# Patient Record
Sex: MALE | Race: WHITE | NOT HISPANIC OR LATINO | Employment: FULL TIME | URBAN - METROPOLITAN AREA
[De-identification: names, ages, dates, MRNs, and addresses within clinical notes are randomized per-mention and may not be internally consistent; named-entity substitution may affect disease eponyms.]

---

## 2017-04-24 ENCOUNTER — OFFICE VISIT (OUTPATIENT)
Dept: MEDICAL GROUP | Facility: CLINIC | Age: 57
End: 2017-04-24
Payer: COMMERCIAL

## 2017-04-24 VITALS
SYSTOLIC BLOOD PRESSURE: 110 MMHG | BODY MASS INDEX: 22.89 KG/M2 | DIASTOLIC BLOOD PRESSURE: 70 MMHG | OXYGEN SATURATION: 98 % | TEMPERATURE: 97.5 F | HEIGHT: 70 IN | HEART RATE: 78 BPM | WEIGHT: 159.9 LBS | RESPIRATION RATE: 18 BRPM

## 2017-04-24 DIAGNOSIS — Z13.29 SCREENING FOR THYROID DISORDER: ICD-10-CM

## 2017-04-24 DIAGNOSIS — Z83.3 FAMILY HISTORY OF DIABETES MELLITUS (DM): ICD-10-CM

## 2017-04-24 DIAGNOSIS — Z13.220 LIPID SCREENING: ICD-10-CM

## 2017-04-24 DIAGNOSIS — Z86.39 H/O HYPOGLYCEMIA: ICD-10-CM

## 2017-04-24 DIAGNOSIS — R06.83 SNORING: ICD-10-CM

## 2017-04-24 DIAGNOSIS — Z12.5 PROSTATE CANCER SCREENING: ICD-10-CM

## 2017-04-24 DIAGNOSIS — Z11.59 NEED FOR HEPATITIS C SCREENING TEST: ICD-10-CM

## 2017-04-24 DIAGNOSIS — F17.200 SMOKER: ICD-10-CM

## 2017-04-24 PROBLEM — K08.9 POOR DENTITION: Status: ACTIVE | Noted: 2017-04-24

## 2017-04-24 PROCEDURE — 99203 OFFICE O/P NEW LOW 30 MIN: CPT | Performed by: NURSE PRACTITIONER

## 2017-04-24 ASSESSMENT — PATIENT HEALTH QUESTIONNAIRE - PHQ9: CLINICAL INTERPRETATION OF PHQ2 SCORE: 0

## 2017-04-24 NOTE — MR AVS SNAPSHOT
"        Dionte Rodriges   2017 8:20 AM   Office Visit   MRN: [id number]    Department:  [facility]   Dept Phone:  [phone]    Description:  Male : 1960   Provider:  ROME Ruvalcaba           Reason for Visit     Establish Care           Allergies as of 2017     No Known Allergies      You were diagnosed with     H/O hypoglycemia   [896687]       Snoring   [987196]       Smoker   [776313]       Prostate cancer screening   [192068]       Lipid screening   [989947]       Screening for thyroid disorder   [V77.0.ICD-9-CM]       Family history of diabetes mellitus (DM)   [709345]       Need for hepatitis C screening test   [460384]         Vital Signs     Blood Pressure Pulse Temperature Respirations Height Weight    110/70 mmHg 78 36.4 °C (97.5 °F) 18 1.778 m (5' 10\") 72.53 kg (159 lb 14.4 oz)    Body Mass Index Oxygen Saturation Smoking Status             22.94 kg/m2 98% Current Every Day Smoker         Basic Information     Date Of Birth Sex Race Ethnicity Preferred Language    1960 Male White Non- English      Problem List              ICD-10-CM Priority Class Noted - Resolved    Smoker F17.200   2017 - Present    Family history of diabetes mellitus (DM) Z83.3   2017 - Present    H/O hypoglycemia Z86.39   2017 - Present    Poor dentition K08.9   2017 - Present      Health Maintenance        Date Due Completion Dates    IMM DTaP/Tdap/Td Vaccine (1 - Tdap) 1979 ---    COLONOSCOPY 2010 ---            Current Immunizations     No immunizations on file.      Below and/or attached are the medications your provider expects you to take. Review all of your home medications and newly ordered medications with your provider and/or pharmacist. Follow medication instructions as directed by your provider and/or pharmacist. Please keep your medication list with you and share with your provider. Update the information when medications are discontinued, " doses are changed, or new medications (including over-the-counter products) are added; and carry medication information at all times in the event of emergency situations     Allergies:  No Known Allergies          Medications  Valid as of: April 24, 2017 -  8:57 AM    Generic Name Brand Name Tablet Size Instructions for use    Multiple Vitamins-Minerals   Take  by mouth.        .                 Medicines prescribed today were sent to:     None      Medication refill instructions:       If your prescription bottle indicates you have medication refills left, it is not necessary to call your provider’s office. Please contact your pharmacy and they will refill your medication.    If your prescription bottle indicates you do not have any refills left, you may request refills at any time through one of the following ways: The online [employer] system (except Urgent Care), by calling your provider’s office, or by asking your pharmacy to contact your provider’s office with a refill request. Medication refills are processed only during regular business hours and may not be available until the next business day. Your provider may request additional information or to have a follow-up visit with you prior to refilling your medication.   *Please Note: Medication refills are assigned a new Rx number when refilled electronically. Your pharmacy may indicate that no refills were authorized even though a new prescription for the same medication is available at the pharmacy. Please request the medicine by name with the pharmacy before contacting your provider for a refill.        Your To Do List     Future Labs/Procedures Complete By Expires    CBC WITH DIFFERENTIAL  As directed 4/25/2018    COMP METABOLIC PANEL  As directed 4/25/2018    HEMOGLOBIN A1C  As directed 4/25/2018    HEP C VIRUS ANTIBODY  As directed 4/25/2018    LIPID PROFILE  As directed 4/25/2018    PROSTATE SPECIFIC AG SCREENING  As directed 4/25/2018    TSH  As directed  4/25/2018         [facility] Status: Patient Declined        Quit Tobacco Information     Do you want to quit using tobacco?    Quitting tobacco decreases risks of cancer, heart and lung disease, increases life expectancy, improves sense of taste and smell, and increases spending money, among other benefits.    If you are thinking about quitting, we can help.  • [facility] Quit Tobacco Program: [phone]  o Program occurs weekly for four weeks and includes pharmacist consultation on products to support quitting smoking or chewing tobacco. A provider referral is needed for pharmacist consultation.  • Tobacco Users Help Hotline: 5-897-QUIT-NOW (092-5290) or https://[place].quitlogix.org/  o Free, confidential telephone and online coaching for [place] residents. Sessions are designed on a schedule that is convenient for you. Eligible clients receive free nicotine replacement therapy.  • Nationally: www.smokefree.gov  o Information and professional assistance to support both immediate and long-term needs as you become, and remain, a non-smoker. Smokefree.gov allows you to choose the help that best fits your needs.

## 2017-04-24 NOTE — PROGRESS NOTES
CC: Establish Care        HPI:     Dionte presents today for the following:  Patient here to establish care. We discussed colonoscopy and colon cancer screening tests in detail, patient is not interested in doing this at this point in time  Transfer from [facility] clinic.   All problems are new to me today  Patient's past medical, family, and social history reviewed and placed in Epic today. Immunizations reviewed. Prescriptions-reviewed and none daily.     1. H/O hypoglycemia  Patient states he occasionally gets hypoglycemic episodes. These are mild. It never involved syncope. He states he doesn't need meals and tell evening time. Did not eat breakfast at night lunch and eats dinner. He sensitivity soda by side at all times to prevent hypoglycemia. Limited diet related poor dentition    2. Snoring  Does snore. Was previously being evaluated for sleep apnea however moved so much at night at the monitor came off his finger and had an incomplete study. Sometimes has difficulty sleeping and wakes up at night however if he sleeps with a fan and doesn't have this problem    3. Smoker  Smoker for 40 years, approximately three-quarter pack a day.    4. Need for hepatitis C screening test  Requesting this today. Has never had a blood transfusion, no IV drug use, monogamous relationship for over 30 years      Current Outpatient Prescriptions   Medication Sig Dispense Refill   • Multiple Vitamins-Minerals (CENTRUM SILVER PO) Take  by mouth.       No current facility-administered medications for this visit.     Social History   Substance Use Topics   • Smoking status: Current Every Day Smoker -- 0.75 packs/day for 40 years   • Smokeless tobacco: Never Used      Comment: 1/2ppd   • Alcohol Use: No      Comment: quite 12/3/1987     I reviewed patients allergies, problem list and medications today in [facility].    ROS: Any/all pertinent positives listed in the HPI, otherwise all others reviewed are negative today.      /70 mmHg   "Pulse 78  Temp(Src) 36.4 °C (97.5 °F)  Resp 18  Ht 1.778 m (5' 10\")  Wt 72.53 kg (159 lb 14.4 oz)  BMI 22.94 kg/m2  SpO2 98%    Exam:    Gen: Alert and oriented, No apparent distress. WDWN  Psych: A+Ox3, normal affect and mood  Skin: Warm, dry and intact. Good turgor   No rashes in visible areas.  Eye: Conjunctiva clear, lids normal  ENMT: Lips without lesions, very poor dentition   Oropharynx clear.   Neck: No Lymphadenopathy, Thyromegaly, Bruits.   Trachea midline, no masses  Lungs: Clear to auscultation bilaterally, no rales or rhonchi   Unlabored respiratory effort.   CV: Regular rate and rhythm, S1, S2. No murmurs.   No Edema  Abd: Soft non tender, non distended. Normal active bowel sounds.    No Hepatosplenomegaly, No pulsatile masses.   Ext: No clubbing, cyanosis, edema.       Assessment and Plan.   56 y.o. male with the following issues.    1. H/O hypoglycemia  Stable. Will monitor blood tests. We discussed more frequent meals with higher protein  - HEMOGLOBIN A1C; Future    2. Snoring  OPO on  room air    3. Smoker  Stable. Is not interested in quitting smoking at this time.  - CBC WITH DIFFERENTIAL; Future  - REFERRAL TO LUNG CANCER SCREENING PROGRAM    4. Prostate cancer screening  Ordered  - PROSTATE SPECIFIC AG SCREENING; Future    5. Lipid screening  Ordered  - LIPID PROFILE; Future    6. Screening for thyroid disorder  Ordered  - TSH; Future    7. Family history of diabetes mellitus (DM)  Ordered  - COMP METABOLIC PANEL; Future    8. Need for hepatitis C screening test  Ordered  - HEP C VIRUS ANTIBODY; Future        "

## 2017-05-09 ENCOUNTER — TELEPHONE (OUTPATIENT)
Dept: MEDICAL GROUP | Facility: CLINIC | Age: 57
End: 2017-05-09

## 2017-05-09 DIAGNOSIS — G47.30 SLEEP DISORDER BREATHING: ICD-10-CM

## 2017-05-09 NOTE — TELEPHONE ENCOUNTER
Please feel patient has test was positive for sleep apnea.  I placed a referral for further evaluation with pulmonology for test in the sleep lab.    If you would like in the meantime I can order oxygen for him to sleep with-please let me know if you would like this and I will order it at that time

## 2017-05-10 ENCOUNTER — DOCUMENTATION (OUTPATIENT)
Dept: HEMATOLOGY ONCOLOGY | Facility: MEDICAL CENTER | Age: 57
End: 2017-05-10

## 2017-05-10 NOTE — PROGRESS NOTES
Received referral to lung cancer screening program.  Chart review to assess for lung cancer screening program eligibility.   1. Age 55-77 yrs of age? Yes 57 y.o.  2. 30 pack year hx of smoking, or greater? Yes .75 [id number]= 30 pkyr hx  3. Current smoker or if quit, has pt quit within last 15 yrs?Yes, current smoker  4. Any signs or symptoms of lung cancer? None noted  5. Previous history of lung cancer? None noted  6. Chest CT within past 12 mos.? None  Patient DOES meet eligibility criteria - [facility] scheduling notified to schedule the shared decision making visit.

## 2017-05-15 ENCOUNTER — HOSPITAL ENCOUNTER (OUTPATIENT)
Dept: LAB | Facility: MEDICAL CENTER | Age: 57
End: 2017-05-15
Attending: NURSE PRACTITIONER
Payer: COMMERCIAL

## 2017-05-15 DIAGNOSIS — Z11.59 NEED FOR HEPATITIS C SCREENING TEST: ICD-10-CM

## 2017-05-15 DIAGNOSIS — Z12.5 PROSTATE CANCER SCREENING: ICD-10-CM

## 2017-05-15 DIAGNOSIS — Z13.220 LIPID SCREENING: ICD-10-CM

## 2017-05-15 DIAGNOSIS — Z13.29 SCREENING FOR THYROID DISORDER: ICD-10-CM

## 2017-05-15 DIAGNOSIS — F17.200 SMOKER: ICD-10-CM

## 2017-05-15 DIAGNOSIS — Z86.39 H/O HYPOGLYCEMIA: ICD-10-CM

## 2017-05-15 DIAGNOSIS — Z83.3 FAMILY HISTORY OF DIABETES MELLITUS (DM): ICD-10-CM

## 2017-05-15 LAB
ALBUMIN SERPL BCP-MCNC: 4.3 G/DL (ref 3.2–4.9)
ALBUMIN/GLOB SERPL: 1.5 G/DL
ALP SERPL-CCNC: 106 U/L (ref 30–99)
ALT SERPL-CCNC: 16 U/L (ref 2–50)
ANION GAP SERPL CALC-SCNC: 5 MMOL/L (ref 0–11.9)
AST SERPL-CCNC: 17 U/L (ref 12–45)
BASOPHILS # BLD AUTO: 0.3 % (ref 0–1.8)
BASOPHILS # BLD: 0.03 K/UL (ref 0–0.12)
BILIRUB SERPL-MCNC: 0.8 MG/DL (ref 0.1–1.5)
BUN SERPL-MCNC: 12 MG/DL (ref 8–22)
CALCIUM SERPL-MCNC: 9.6 MG/DL (ref 8.5–10.5)
CHLORIDE SERPL-SCNC: 104 MMOL/L (ref 96–112)
CHOLEST SERPL-MCNC: 185 MG/DL (ref 100–199)
CO2 SERPL-SCNC: 30 MMOL/L (ref 20–33)
CREAT SERPL-MCNC: 1.13 MG/DL (ref 0.5–1.4)
EOSINOPHIL # BLD AUTO: 0.31 K/UL (ref 0–0.51)
EOSINOPHIL NFR BLD: 2.7 % (ref 0–6.9)
ERYTHROCYTE [DISTWIDTH] IN BLOOD BY AUTOMATED COUNT: 39.3 FL (ref 35.9–50)
EST. AVERAGE GLUCOSE BLD GHB EST-MCNC: 108 MG/DL
GFR SERPL CREATININE-BSD FRML MDRD: >60 ML/MIN/1.73 M 2
GLOBULIN SER CALC-MCNC: 2.9 G/DL (ref 1.9–3.5)
GLUCOSE SERPL-MCNC: 92 MG/DL (ref 65–99)
HBA1C MFR BLD: 5.4 % (ref 0–5.6)
HCT VFR BLD AUTO: 54 % (ref 42–52)
HCV AB SER QL: NEGATIVE
HDLC SERPL-MCNC: 38 MG/DL
HGB BLD-MCNC: 18.6 G/DL (ref 14–18)
IMM GRANULOCYTES # BLD AUTO: 0.03 K/UL (ref 0–0.11)
IMM GRANULOCYTES NFR BLD AUTO: 0.3 % (ref 0–0.9)
LDLC SERPL CALC-MCNC: 112 MG/DL
LYMPHOCYTES # BLD AUTO: 2.38 K/UL (ref 1–4.8)
LYMPHOCYTES NFR BLD: 20.4 % (ref 22–41)
MCH RBC QN AUTO: 30.9 PG (ref 27–33)
MCHC RBC AUTO-ENTMCNC: 34.4 G/DL (ref 33.7–35.3)
MCV RBC AUTO: 89.7 FL (ref 81.4–97.8)
MONOCYTES # BLD AUTO: 0.66 K/UL (ref 0–0.85)
MONOCYTES NFR BLD AUTO: 5.7 % (ref 0–13.4)
NEUTROPHILS # BLD AUTO: 8.24 K/UL (ref 1.82–7.42)
NEUTROPHILS NFR BLD: 70.6 % (ref 44–72)
NRBC # BLD AUTO: 0 K/UL
NRBC BLD AUTO-RTO: 0 /100 WBC
PLATELET # BLD AUTO: 216 K/UL (ref 164–446)
PMV BLD AUTO: 10.5 FL (ref 9–12.9)
POTASSIUM SERPL-SCNC: 4.6 MMOL/L (ref 3.6–5.5)
PROT SERPL-MCNC: 7.2 G/DL (ref 6–8.2)
PSA SERPL-MCNC: 2.65 NG/ML (ref 0–4)
RBC # BLD AUTO: 6.02 M/UL (ref 4.7–6.1)
SODIUM SERPL-SCNC: 139 MMOL/L (ref 135–145)
TRIGL SERPL-MCNC: 175 MG/DL (ref 0–149)
TSH SERPL DL<=0.005 MIU/L-ACNC: 2.58 UIU/ML (ref 0.3–3.7)
WBC # BLD AUTO: 11.7 K/UL (ref 4.8–10.8)

## 2017-05-15 PROCEDURE — 84153 ASSAY OF PSA TOTAL: CPT

## 2017-05-15 PROCEDURE — 85025 COMPLETE CBC W/AUTO DIFF WBC: CPT

## 2017-05-15 PROCEDURE — 84443 ASSAY THYROID STIM HORMONE: CPT

## 2017-05-15 PROCEDURE — 86803 HEPATITIS C AB TEST: CPT

## 2017-05-15 PROCEDURE — 80053 COMPREHEN METABOLIC PANEL: CPT

## 2017-05-15 PROCEDURE — 80061 LIPID PANEL: CPT

## 2017-05-15 PROCEDURE — 36415 COLL VENOUS BLD VENIPUNCTURE: CPT

## 2017-05-15 PROCEDURE — 83036 HEMOGLOBIN GLYCOSYLATED A1C: CPT

## 2017-06-12 ENCOUNTER — OFFICE VISIT (OUTPATIENT)
Dept: HEMATOLOGY ONCOLOGY | Facility: MEDICAL CENTER | Age: 57
End: 2017-06-12
Payer: COMMERCIAL

## 2017-06-12 VITALS
TEMPERATURE: 98.8 F | HEIGHT: 70 IN | RESPIRATION RATE: 16 BRPM | DIASTOLIC BLOOD PRESSURE: 64 MMHG | BODY MASS INDEX: 23.61 KG/M2 | OXYGEN SATURATION: 97 % | SYSTOLIC BLOOD PRESSURE: 106 MMHG | HEART RATE: 73 BPM | WEIGHT: 164.9 LBS

## 2017-06-12 DIAGNOSIS — F17.210 CIGARETTE SMOKER: ICD-10-CM

## 2017-06-12 PROCEDURE — G0296 VISIT TO DETERM LDCT ELIG: HCPCS | Performed by: NURSE PRACTITIONER

## 2017-06-12 ASSESSMENT — ENCOUNTER SYMPTOMS
SHORTNESS OF BREATH: 1
WHEEZING: 0
HEMOPTYSIS: 0
SPUTUM PRODUCTION: 0
WEIGHT LOSS: 0
COUGH: 0

## 2017-06-12 ASSESSMENT — PAIN SCALES - GENERAL: PAINLEVEL: NO PAIN

## 2017-06-12 NOTE — MR AVS SNAPSHOT
"        Dionte Rodriges   2017 9:30 AM   Office Visit   MRN: [id number]    Department:  [facility]   Dept Phone:  [phone]    Description:  Male : 1960   Provider:  Sarah Kidd AWilliamsPPAMELA           Reason for Visit     Lung Cancer Screening Program Prescreen Ref: By Sujey Hartman Dx: Smoker      Allergies as of 2017     No Known Allergies      You were diagnosed with     Cigarette smoker   [780254]         Vital Signs     Blood Pressure Pulse Temperature Respirations Height Weight    106/64 mmHg 73 37.1 °C (98.8 °F) 16 1.778 m (5' 10\") 74.8 kg (164 lb 14.5 oz)    Body Mass Index Oxygen Saturation Smoking Status             23.66 kg/m2 97% Current Every Day Smoker         Basic Information     Date Of Birth Sex Race Ethnicity Preferred Language    1960 Male White Non- English      Your appointments     2017 11:20 AM   New Patient with C Rotation   [facility] (--)    [address]  [place] [id number] [phone]           Please bring enclosed paperwork completed along with your insurance card and photo ID.              Problem List              ICD-10-CM Priority Class Noted - Resolved    Smoker F17.200   2017 - Present    Family history of diabetes mellitus (DM) Z83.3   2017 - Present    H/O hypoglycemia Z86.39   2017 - Present    Poor dentition K08.9   2017 - Present      Health Maintenance        Date Due Completion Dates    IMM DTaP/Tdap/Td Vaccine (1 - Tdap) 1979 ---    IMM PNEUMOCOCCAL 19-64 (ADULT) MEDIUM RISK SERIES (1 of 1 - PPSV23) 1979 ---    COLONOSCOPY 2010 ---            Current Immunizations     No immunizations on file.      Below and/or attached are the medications your provider expects you to take. Review all of your home medications and newly ordered medications with your provider and/or pharmacist. Follow medication instructions as directed by your provider and/or " pharmacist. Please keep your medication list with you and share with your provider. Update the information when medications are discontinued, doses are changed, or new medications (including over-the-counter products) are added; and carry medication information at all times in the event of emergency situations     Allergies:  No Known Allergies          Medications  Valid as of: June 12, 2017 - 10:01 AM    Generic Name Brand Name Tablet Size Instructions for use    Multiple Vitamins-Minerals   Take  by mouth.        .                 Medicines prescribed today were sent to:     [id number] - [place], [place] - [address]    [address] [place] [address]    Phone: [phone] Fax: [phone]    Open 24 Hours?: No      Medication refill instructions:       If your prescription bottle indicates you have medication refills left, it is not necessary to call your provider’s office. Please contact your pharmacy and they will refill your medication.    If your prescription bottle indicates you do not have any refills left, you may request refills at any time through one of the following ways: The online [employer] system (except Urgent Care), by calling your provider’s office, or by asking your pharmacy to contact your provider’s office with a refill request. Medication refills are processed only during regular business hours and may not be available until the next business day. Your provider may request additional information or to have a follow-up visit with you prior to refilling your medication.   *Please Note: Medication refills are assigned a new Rx number when refilled electronically. Your pharmacy may indicate that no refills were authorized even though a new prescription for the same medication is available at the pharmacy. Please request the medicine by name with the pharmacy before contacting your provider for a refill.        Your To Do List     Future Labs/Procedures Complete By Expires    CT-LUNG  CANCER-SCREENING  As directed 6/12/2018         [employer] Access Code: Activation code not generated  Current [employer] Status: Active          Quit Tobacco Information     Do you want to quit using tobacco?    Quitting tobacco decreases risks of cancer, heart and lung disease, increases life expectancy, improves sense of taste and smell, and increases spending money, among other benefits.    If you are thinking about quitting, we can help.  • [facility] Quit Tobacco Program: 425.578.3419  o Program occurs weekly for four weeks and includes pharmacist consultation on products to support quitting smoking or chewing tobacco. A provider referral is needed for pharmacist consultation.  • Tobacco Users Help Hotline: 0-522-QUIT-NOW (740-8103) or https://[place].quitlogix.org/  o Free, confidential telephone and online coaching for [place] residents. Sessions are designed on a schedule that is convenient for you. Eligible clients receive free nicotine replacement therapy.  • Nationally: www.smokefree.gov  o Information and professional assistance to support both immediate and long-term needs as you become, and remain, a non-smoker. Smokefree.gov allows you to choose the help that best fits your needs.

## 2017-06-12 NOTE — PROGRESS NOTES
"Subjective:      Dionte Rodriges is a 56 y.o. male who presents with Lung Cancer Screening Program Prescreen for lung cancer screening shared decision making visit.           HPI    Patient seen today for initial lung cancer screening visit. Patient referred by his PCP ISATU Orozco.     The patient meets eligibility criteria including age, smoking history (30+ pack years), if former smoker, quit in the last 15 years, and absence of signs or symptoms of lung cancer.    - Age - 56  - Smoking history - Patient has smoked for 42 years at an average of 0.75 ppd = 31.5 pack year smoking history.  - Current smoking status - Current smoker and he is not interested in quitting. Patient stated he is cutting back a little bit but he will not quit altogether.   - No symptoms of lung cancer and no previous history of lung cancer     Patient worked around asbestos for about 9 years.     No Known Allergies  Current Outpatient Prescriptions on File Prior to Visit   Medication Sig Dispense Refill   • Multiple Vitamins-Minerals (CENTRUM SILVER PO) Take  by mouth.       No current facility-administered medications on file prior to visit.       Review of Systems   Constitutional: Positive for malaise/fatigue (d/t job as a ). Negative for weight loss.   Respiratory: Positive for shortness of breath. Negative for cough, hemoptysis, sputum production and wheezing.           Objective:     /64 mmHg  Pulse 73  Temp(Src) 37.1 °C (98.8 °F)  Resp 16  Ht 1.778 m (5' 10\")  Wt 74.8 kg (164 lb 14.5 oz)  BMI 23.66 kg/m2  SpO2 97%     Physical Exam   Constitutional: He is oriented to person, place, and time. He appears well-developed and well-nourished. No distress.   HENT:   Head: Normocephalic and atraumatic.   Cardiovascular: Normal rate, regular rhythm and normal heart sounds.  Exam reveals no gallop and no friction rub.    No murmur heard.  Pulmonary/Chest: Effort normal and breath sounds normal. No respiratory " distress. He has no wheezes.   Musculoskeletal: Normal range of motion.   Neurological: He is alert and oriented to person, place, and time.   Skin: Skin is warm and dry. He is not diaphoretic.   Vitals reviewed.              Assessment/Plan:     1. Cigarette smoker  CT-LUNG CANCER-SCREENING         We conducted a shared decision-making process using a decision aid. We reviewed benefits and harms of screening, including false positives and potential need for additional diagnostic testing, the possibility of over diagnosis, and total radiation exposure.    We discussed the importance of adhering to annual LDCT screening. We also discussed the impact of comorbities on the patient's the ability or willingness to undergo diagnostic procedure(s) and treatment.    Counseling on the importance of maintaining cigarette smoking abstinence if former smoker; or the importance of smoking cessation if current smoker and, if appropriate, furnishing of information about tobacco cessation interventions. I provided patient with smoking cessation materials and resources within [employer] and the community. He was not interested in the resources for himself, but stated his wife is interested in quitting so he did take the resources for her. Patient appreciative of the resources.       Based on our discussion, we have decided to begin annual lung cancer screening starting now.

## 2017-07-10 ENCOUNTER — HOSPITAL ENCOUNTER (OUTPATIENT)
Dept: RADIOLOGY | Facility: MEDICAL CENTER | Age: 57
End: 2017-07-10
Attending: NURSE PRACTITIONER
Payer: COMMERCIAL

## 2017-07-10 DIAGNOSIS — F17.210 CIGARETTE SMOKER: ICD-10-CM

## 2017-07-10 PROCEDURE — G0297 LDCT FOR LUNG CA SCREEN: HCPCS

## 2017-07-12 ENCOUNTER — TELEPHONE (OUTPATIENT)
Dept: HEMATOLOGY ONCOLOGY | Facility: MEDICAL CENTER | Age: 57
End: 2017-07-12

## 2017-07-12 DIAGNOSIS — J43.1 PANLOBULAR EMPHYSEMA (HCC): ICD-10-CM

## 2017-07-12 NOTE — TELEPHONE ENCOUNTER
Phoned patient with results of LDCT exam performed on 7/10/2017 following review with ISATU Palumbo. Notified him that the results showed no lung nodules and no sign of lung cancer. Recommended to continue annual screening with LDCT in 12 months.  Also informed him the results showed hyperexpanded and emphysematous lungs. Pt states he is not having any SOB or difficulties breathing. Informed him we will update his PCP and to follow up with her with any questions or concerns.  Also informed him that we are asking his PCP to order the annual LDCT exam next year. Pt verbalized understanding and agreed. Health maintenance updated and result letter sent to pt.

## 2017-07-13 PROBLEM — J43.1 PANLOBULAR EMPHYSEMA (HCC): Status: ACTIVE | Noted: 2017-07-13

## 2017-07-24 ENCOUNTER — APPOINTMENT (OUTPATIENT)
Dept: SLEEP MEDICINE | Facility: MEDICAL CENTER | Age: 57
End: 2017-07-24
Payer: COMMERCIAL

## 2017-10-23 ENCOUNTER — APPOINTMENT (OUTPATIENT)
Dept: SLEEP MEDICINE | Facility: MEDICAL CENTER | Age: 57
End: 2017-10-23
Payer: COMMERCIAL

## 2019-02-14 ENCOUNTER — PATIENT OUTREACH (OUTPATIENT)
Dept: OTHER | Facility: MEDICAL CENTER | Age: 59
End: 2019-02-14

## 2019-04-11 ENCOUNTER — PATIENT OUTREACH (OUTPATIENT)
Dept: OTHER | Facility: MEDICAL CENTER | Age: 59
End: 2019-04-11

## 2019-04-24 ENCOUNTER — TELEPHONE (OUTPATIENT)
Dept: HEALTH INFORMATION MANAGEMENT | Facility: OTHER | Age: 59
End: 2019-04-24

## 2019-04-24 DIAGNOSIS — Z12.2 ENCOUNTER FOR SCREENING FOR MALIGNANT NEOPLASM OF RESPIRATORY ORGANS: Primary | ICD-10-CM

## 2019-04-24 DIAGNOSIS — F17.210 HEAVY SMOKER (MORE THAN 20 CIGARETTES PER DAY): ICD-10-CM

## 2019-04-24 NOTE — TELEPHONE ENCOUNTER
Outcome: Left Message    Please transfer to Patient Outreach Team at [phone] when patient returns call.    Attempt # 1

## 2019-04-30 NOTE — TELEPHONE ENCOUNTER
Dionte is due for a low-dose chest CT for annual lung cancer screening. Please review the pended order and sign. If this is a test you wish for Dionte not to receive, please respond with reason why and route back to the Patient Outreach Team.

## 2019-05-06 ENCOUNTER — OFFICE VISIT (OUTPATIENT)
Dept: MEDICAL GROUP | Facility: MEDICAL CENTER | Age: 59
End: 2019-05-06
Payer: COMMERCIAL

## 2019-05-06 VITALS
HEIGHT: 70 IN | SYSTOLIC BLOOD PRESSURE: 90 MMHG | OXYGEN SATURATION: 94 % | WEIGHT: 167.55 LBS | HEART RATE: 78 BPM | TEMPERATURE: 97.1 F | DIASTOLIC BLOOD PRESSURE: 42 MMHG | BODY MASS INDEX: 23.99 KG/M2

## 2019-05-06 DIAGNOSIS — Z23 NEED FOR VACCINATION: ICD-10-CM

## 2019-05-06 DIAGNOSIS — Z12.5 PROSTATE CANCER SCREENING: ICD-10-CM

## 2019-05-06 DIAGNOSIS — Z12.2 ENCOUNTER FOR SCREENING FOR MALIGNANT NEOPLASM OF RESPIRATORY ORGANS: ICD-10-CM

## 2019-05-06 DIAGNOSIS — G47.34 NOCTURNAL HYPOXIA: ICD-10-CM

## 2019-05-06 DIAGNOSIS — Z12.2 ENCOUNTER FOR SCREENING FOR LUNG CANCER: ICD-10-CM

## 2019-05-06 DIAGNOSIS — F17.200 SMOKER: ICD-10-CM

## 2019-05-06 DIAGNOSIS — Z12.11 COLON CANCER SCREENING: ICD-10-CM

## 2019-05-06 DIAGNOSIS — J43.1 PANLOBULAR EMPHYSEMA (HCC): ICD-10-CM

## 2019-05-06 DIAGNOSIS — Z13.220 SCREENING, LIPID: ICD-10-CM

## 2019-05-06 PROCEDURE — 99396 PREV VISIT EST AGE 40-64: CPT | Mod: 25 | Performed by: NURSE PRACTITIONER

## 2019-05-06 PROCEDURE — 90471 IMMUNIZATION ADMIN: CPT | Performed by: NURSE PRACTITIONER

## 2019-05-06 PROCEDURE — 90715 TDAP VACCINE 7 YRS/> IM: CPT | Performed by: NURSE PRACTITIONER

## 2019-05-06 PROCEDURE — 90472 IMMUNIZATION ADMIN EACH ADD: CPT | Performed by: NURSE PRACTITIONER

## 2019-05-06 PROCEDURE — 90732 PPSV23 VACC 2 YRS+ SUBQ/IM: CPT | Performed by: NURSE PRACTITIONER

## 2019-05-06 ASSESSMENT — PATIENT HEALTH QUESTIONNAIRE - PHQ9: CLINICAL INTERPRETATION OF PHQ2 SCORE: 0

## 2019-05-06 NOTE — PROGRESS NOTES
"CC: Orders Needed (ct scan for lungs)        HPI:     Dionte presents today for the followin. Panlobular emphysema (HCC)  Not using inhalers, diagnosed on CT.  States he is asymptomatic no reports of hypoxia, shortness of breath or cough    2. Smoker  Declines to quit smoking    3. Nocturnal hypoxia  O p.o. test done approximately 2 years ago.  Patient declines oxygen or CPAP    4. Encounter for screening for lung cancer  The patient meets eligibility criteria including age, smoking history (30+ pack years), if former smoker, quit in the last 15 years, and absence of signs or symptoms of lung cancer.     - Age - 58  - Smoking history - Patient has smoked for 44 years at an average of 0.75 ppd = 33 pack year smoking history.  - Current smoking status - Current smoker and he is not interested in quitting. Patient stated he is cutting back a little bit but he will not quit altogether.   - No symptoms of lung cancer and no previous history of lung cancer     5. Need for vaccination      9. Colon cancer screening  Declines    Current Outpatient Prescriptions   Medication Sig Dispense Refill   • Multiple Vitamins-Minerals (CENTRUM SILVER PO) Take  by mouth.       No current facility-administered medications for this visit.      Social History   Substance Use Topics   • Smoking status: Current Every Day Smoker     Packs/day: 1.00     Years: 45.00     Types: Cigarettes   • Smokeless tobacco: Never Used      Comment: Started at 45   • Alcohol use No      Comment: quite 12/3/1987     I reviewed patients allergies, problem list and medications today in [facility].    ROS: Any/all pertinent positives listed in the HPI, otherwise all others reviewed are negative today.      BP (!) 90/42   Pulse 78   Temp 36.2 °C (97.1 °F)   Ht 1.778 m (5' 10\")   Wt 76 kg (167 lb 8.8 oz)   SpO2 94%   BMI 24.04 kg/m²     Exam:    ert and oriented, No apparent distress. WDWN  Psych: A+Ox3, normal affect and mood  Skin: Warm, dry and intact. " Good turgor   No rashes in visible areas.  Eye: Conjunctiva clear, lids normal  ENMT: Lips without lesions, poor dentition  Neck: No Lymphadenopathy, Thyromegaly, Bruits.   Trachea midline, no masses  Lungs: Clear to auscultation bilaterally, no rales or rhonchi   Unlabored respiratory effort.   CV: Regular rate and rhythm, S1, S2. No murmurs.   No Edema        Assessment and Plan.   58 y.o. male with the following issues.    1. Panlobular emphysema (HCC)  Asymptomatic.  Declines to quit smoking    2. Smoker  Does not want to quit    3. Nocturnal hypoxia  Patient declines any intervention.  Information from up-to-date on sleep apnea given to patient    4. Encounter for screening for lung cancer/Encounter for screening for malignant neoplasm of respiratory organs  Ordered.  Encouraged to quit  - CT-CHEST W/O LOW DOSE; Future    6. Prostate cancer screening  Ordered  - PROSTATE SPECIFIC AG SCREENING; Future    7. Screening, lipid  Ordered  - Comp Metabolic Panel; Future  - Lipid Profile; Future    8. Need for vaccination  I have placed the below orders and discussed them with an approved delegating provider. The MA is performing the below orders under the direction of an Office [place].    - Tdap Vaccine =>6YO IM  - Pneumococal Polysaccharide Vaccine 23-Valent =>3YO SQ/IM    9. Colon cancer screening  Declines

## 2019-05-07 ENCOUNTER — PATIENT OUTREACH (OUTPATIENT)
Dept: OTHER | Facility: MEDICAL CENTER | Age: 59
End: 2019-05-07

## 2019-05-23 ENCOUNTER — PATIENT OUTREACH (OUTPATIENT)
Dept: OTHER | Facility: MEDICAL CENTER | Age: 59
End: 2019-05-23

## 2019-06-19 ENCOUNTER — HOSPITAL ENCOUNTER (OUTPATIENT)
Dept: RADIOLOGY | Facility: MEDICAL CENTER | Age: 59
End: 2019-06-19
Attending: NURSE PRACTITIONER
Payer: COMMERCIAL

## 2019-06-19 DIAGNOSIS — Z12.2 ENCOUNTER FOR SCREENING FOR MALIGNANT NEOPLASM OF RESPIRATORY ORGANS: ICD-10-CM

## 2019-06-19 DIAGNOSIS — Z12.2 ENCOUNTER FOR SCREENING FOR LUNG CANCER: ICD-10-CM

## 2019-06-19 PROCEDURE — G0297 LDCT FOR LUNG CA SCREEN: HCPCS

## 2020-05-27 ENCOUNTER — TELEPHONE (OUTPATIENT)
Dept: SCHEDULING | Facility: IMAGING CENTER | Age: 60
End: 2020-05-27

## 2020-05-27 DIAGNOSIS — Z12.2 ENCOUNTER FOR SCREENING FOR MALIGNANT NEOPLASM OF RESPIRATORY ORGANS: ICD-10-CM

## 2020-05-27 NOTE — TELEPHONE ENCOUNTER
Outcome: Left Message    Please transfer to Patient Outreach Team at [phone] when patient returns call.      Attempt # 1

## 2020-06-01 ENCOUNTER — TELEPHONE (OUTPATIENT)
Dept: HEALTH INFORMATION MANAGEMENT | Facility: OTHER | Age: 60
End: 2020-06-01

## 2020-06-01 NOTE — TELEPHONE ENCOUNTER
1. Caller Name: Dionte Rodriges                Call Back Number: [phone]  [facility] PCP or Specialty Provider: Yes         2.  Does patient have any new or worsening symptoms of respiratory illness? Yes, the patient reports the following respiratory symptoms: cough, chronic X 7 months, history of smoker  3.  Does patient have any comoribidities? COPD    4.  Has the patient traveled in the last 14 days OR had any known contact with someone who is suspected or confirmed to have COVID-19?  No.    5. Disposition: Cleared by RN Triage as potential is low for COVID-19; OK to keep/schedule appointment    Note routed to [facility] Provider: FYI only.

## 2020-06-01 NOTE — TELEPHONE ENCOUNTER
Good afternoon SujeyDionte humphries is due for a low-dose chest CT for annual lung cancer screening. The patient has been asked the screening questions and qualifies and would like the LDCT to be completed. Please review the pended order and sign. If this is a test you wish for Dionte not to receive, please respond with reason why and route back to the Patient Outreach Team.    Thank you.

## 2020-06-15 ENCOUNTER — OFFICE VISIT (OUTPATIENT)
Dept: MEDICAL GROUP | Facility: MEDICAL CENTER | Age: 60
End: 2020-06-15
Payer: COMMERCIAL

## 2020-06-15 VITALS
RESPIRATION RATE: 16 BRPM | TEMPERATURE: 98.7 F | SYSTOLIC BLOOD PRESSURE: 128 MMHG | OXYGEN SATURATION: 98 % | HEART RATE: 88 BPM | BODY MASS INDEX: 23.52 KG/M2 | WEIGHT: 168 LBS | HEIGHT: 71 IN | DIASTOLIC BLOOD PRESSURE: 76 MMHG

## 2020-06-15 DIAGNOSIS — Z13.21 ENCOUNTER FOR VITAMIN DEFICIENCY SCREENING: ICD-10-CM

## 2020-06-15 DIAGNOSIS — Z13.220 SCREENING, LIPID: ICD-10-CM

## 2020-06-15 DIAGNOSIS — Z83.3 FAMILY HISTORY OF DIABETES MELLITUS: ICD-10-CM

## 2020-06-15 DIAGNOSIS — G47.34 NOCTURNAL HYPOXIA: ICD-10-CM

## 2020-06-15 DIAGNOSIS — Z13.29 SCREENING FOR THYROID DISORDER: ICD-10-CM

## 2020-06-15 DIAGNOSIS — L29.9 ITCH: ICD-10-CM

## 2020-06-15 DIAGNOSIS — Z12.5 PROSTATE CANCER SCREENING: ICD-10-CM

## 2020-06-15 DIAGNOSIS — K21.9 GASTROESOPHAGEAL REFLUX DISEASE, ESOPHAGITIS PRESENCE NOT SPECIFIED: ICD-10-CM

## 2020-06-15 DIAGNOSIS — R79.89 ABNORMAL CBC: ICD-10-CM

## 2020-06-15 DIAGNOSIS — R97.20 ELEVATED PSA: ICD-10-CM

## 2020-06-15 PROCEDURE — 99214 OFFICE O/P EST MOD 30 MIN: CPT | Performed by: NURSE PRACTITIONER

## 2020-06-15 RX ORDER — TRIAMCINOLONE ACETONIDE 1 MG/G
1 CREAM TOPICAL 2 TIMES DAILY
Qty: 1 TUBE | Refills: 1 | Status: SHIPPED | OUTPATIENT
Start: 2020-06-15 | End: 2021-07-26

## 2020-06-15 ASSESSMENT — PATIENT HEALTH QUESTIONNAIRE - PHQ9
SUM OF ALL RESPONSES TO PHQ QUESTIONS 1-9: 9
CLINICAL INTERPRETATION OF PHQ2 SCORE: 6
5. POOR APPETITE OR OVEREATING: 0 - NOT AT ALL

## 2020-06-15 NOTE — PROGRESS NOTES
CC: Annual Exam        HPI:     Dionte presents today for the followin. Abnormal CBC  History abnormal CBC in 2017    2. Itch  Complains of an itching type sensation on the lateral side of his right forearm.  Comes and goes has been present for a long time however feels it is more frequent now.  Does not bother him specifically associated with different movements or positions.  Tried putting upon skin cream on it with no improvement    3. Gastroesophageal reflux disease, esophagitis presence not specified  Complains of heartburn daily.  He states he takes 1 over-the-counter Tums and it resolves.  He is a smoker    4. Elevated PSA/Prostate cancer screening  History of elevated PSA    6. Nocturnal hypoxia  2017 had a home O p.o. on room air which showed significant desaturation events with the lowest O2 being 82%    7. Family history of diabetes mellitus (DM)  Previous years sugar test normal    8.  Smoker  No intention of quitting.  He does a ordered routine lung cancer screening program imaging order.        Current Outpatient Medications   Medication Sig Dispense Refill   • triamcinolone acetonide (KENALOG) 0.1 % Cream Apply 1 Application to affected area(s) 2 times a day. For up to 2 weeks 1 Tube 1   • Multiple Vitamins-Minerals (CENTRUM SILVER PO) Take  by mouth.       No current facility-administered medications for this visit.      Social History     Tobacco Use   • Smoking status: Current Every Day Smoker     Packs/day: 1.00     Years: 45.00     Pack years: 45.00     Types: Cigarettes   • Smokeless tobacco: Never Used   • Tobacco comment: Started at 45   Substance Use Topics   • Alcohol use: No     Alcohol/week: 0.0 oz     Comment: quite 12/3/1987   • Drug use: No     I reviewed patients allergies, problem list and medications today in [facility].    ROS: Any/all pertinent positives listed in the HPI, otherwise all others reviewed are negative today.      /76 (BP Location: Left arm)   Pulse 88   Temp  "37.1 °C (98.7 °F)   Resp 16   Ht 1.803 m (5' 11\")   Wt 76.2 kg (168 lb)   SpO2 98%   BMI 23.43 kg/m²     Exam:    Gen: Alert and oriented, No apparent distress. WDWN  Psych: A+Ox3, normal affect and mood  Skin: Warm, dry and intact. Good turgor   No rashes in visible areas.  Eye: Conjunctiva clear, lids normal  ENMT: Masked  Neck: No Lymphadenopathy, Thyromegaly, Bruits.   Trachea midline, no masses  Lungs: Clear to auscultation bilaterally, no rales or rhonchi   Unlabored respiratory effort.   CV: Regular rate and rhythm, S1, S2. No murmurs.   No Edema  Abd: Soft non tender, non distended. Normal active bowel sounds.    No Hepatosplenomegaly, No pulsatile masses.   Clubbing at fingertips, chronic  No reproducible tenderness looking for tendinopathy's in the right lower arm.  No pain.  No external skin changes.  Radial pulse 2+    Assessment and Plan.   59 y.o. male with the following issues.    1. Abnormal CBC  Monitor  - CBC WITH DIFFERENTIAL; Future    2. Itch  Trial of triamcinolone  - triamcinolone acetonide (KENALOG) 0.1 % Cream; Apply 1 Application to affected area(s) 2 times a day. For up to 2 weeks  Dispense: 1 Tube; Refill: 1    3. Gastroesophageal reflux disease, esophagitis presence not specified  Declines intervention    4. Elevated PSA/Prostate cancer screening  Ordered  - PROSTATE SPECIFIC AG SCREENING; Future    6. Nocturnal hypoxia  Declines intervention.  We did discuss risks associated with untreated sleep apnea.    7. Family history of diabetes mellitus (DM)  Monitor labs  - Comp Metabolic Panel; Future    8. Screening for thyroid disorder  Ordered  - TSH; Future    9. Encounter for vitamin deficiency screening  Ordered  - VITAMIN D,25 HYDROXY; Future    10. Screening, lipid  Ordered  - Lipid Profile; Future        "

## 2020-06-25 ENCOUNTER — HOSPITAL ENCOUNTER (OUTPATIENT)
Dept: RADIOLOGY | Facility: MEDICAL CENTER | Age: 60
End: 2020-06-25
Attending: NURSE PRACTITIONER
Payer: COMMERCIAL

## 2020-06-25 DIAGNOSIS — Z12.2 ENCOUNTER FOR SCREENING FOR MALIGNANT NEOPLASM OF RESPIRATORY ORGANS: ICD-10-CM

## 2020-06-25 PROCEDURE — G0297 LDCT FOR LUNG CA SCREEN: HCPCS

## 2020-06-26 ENCOUNTER — HOSPITAL ENCOUNTER (OUTPATIENT)
Dept: LAB | Facility: MEDICAL CENTER | Age: 60
End: 2020-06-26
Attending: NURSE PRACTITIONER
Payer: COMMERCIAL

## 2020-06-26 DIAGNOSIS — Z83.3 FAMILY HISTORY OF DIABETES MELLITUS: ICD-10-CM

## 2020-06-26 DIAGNOSIS — Z13.29 SCREENING FOR THYROID DISORDER: ICD-10-CM

## 2020-06-26 DIAGNOSIS — Z13.21 ENCOUNTER FOR VITAMIN DEFICIENCY SCREENING: ICD-10-CM

## 2020-06-26 DIAGNOSIS — R79.89 ABNORMAL CBC: ICD-10-CM

## 2020-06-26 DIAGNOSIS — Z12.5 PROSTATE CANCER SCREENING: ICD-10-CM

## 2020-06-26 DIAGNOSIS — Z13.220 SCREENING, LIPID: ICD-10-CM

## 2020-06-26 LAB
25(OH)D3 SERPL-MCNC: 38 NG/ML (ref 30–100)
ALBUMIN SERPL BCP-MCNC: 4.2 G/DL (ref 3.2–4.9)
ALBUMIN/GLOB SERPL: 1.6 G/DL
ALP SERPL-CCNC: 111 U/L (ref 30–99)
ALT SERPL-CCNC: 34 U/L (ref 2–50)
ANION GAP SERPL CALC-SCNC: 11 MMOL/L (ref 7–16)
AST SERPL-CCNC: 34 U/L (ref 12–45)
BASOPHILS # BLD AUTO: 0.7 % (ref 0–1.8)
BASOPHILS # BLD: 0.1 K/UL (ref 0–0.12)
BILIRUB SERPL-MCNC: 0.5 MG/DL (ref 0.1–1.5)
BUN SERPL-MCNC: 12 MG/DL (ref 8–22)
CALCIUM SERPL-MCNC: 9.3 MG/DL (ref 8.5–10.5)
CHLORIDE SERPL-SCNC: 101 MMOL/L (ref 96–112)
CHOLEST SERPL-MCNC: 211 MG/DL (ref 100–199)
CO2 SERPL-SCNC: 24 MMOL/L (ref 20–33)
CREAT SERPL-MCNC: 1.09 MG/DL (ref 0.5–1.4)
EOSINOPHIL # BLD AUTO: 0.41 K/UL (ref 0–0.51)
EOSINOPHIL NFR BLD: 3.1 % (ref 0–6.9)
ERYTHROCYTE [DISTWIDTH] IN BLOOD BY AUTOMATED COUNT: 38.7 FL (ref 35.9–50)
FASTING STATUS PATIENT QL REPORTED: NORMAL
GLOBULIN SER CALC-MCNC: 2.7 G/DL (ref 1.9–3.5)
GLUCOSE SERPL-MCNC: 100 MG/DL (ref 65–99)
HCT VFR BLD AUTO: 57.1 % (ref 42–52)
HDLC SERPL-MCNC: 38 MG/DL
HGB BLD-MCNC: 19.7 G/DL (ref 14–18)
IMM GRANULOCYTES # BLD AUTO: 0.08 K/UL (ref 0–0.11)
IMM GRANULOCYTES NFR BLD AUTO: 0.6 % (ref 0–0.9)
LDLC SERPL CALC-MCNC: 124 MG/DL
LYMPHOCYTES # BLD AUTO: 3.05 K/UL (ref 1–4.8)
LYMPHOCYTES NFR BLD: 22.7 % (ref 22–41)
MCH RBC QN AUTO: 31.1 PG (ref 27–33)
MCHC RBC AUTO-ENTMCNC: 34.5 G/DL (ref 33.7–35.3)
MCV RBC AUTO: 90.2 FL (ref 81.4–97.8)
MONOCYTES # BLD AUTO: 0.78 K/UL (ref 0–0.85)
MONOCYTES NFR BLD AUTO: 5.8 % (ref 0–13.4)
NEUTROPHILS # BLD AUTO: 8.99 K/UL (ref 1.82–7.42)
NEUTROPHILS NFR BLD: 67.1 % (ref 44–72)
NRBC # BLD AUTO: 0 K/UL
NRBC BLD-RTO: 0 /100 WBC
PLATELET # BLD AUTO: 194 K/UL (ref 164–446)
PMV BLD AUTO: 10.3 FL (ref 9–12.9)
POTASSIUM SERPL-SCNC: 4.6 MMOL/L (ref 3.6–5.5)
PROT SERPL-MCNC: 6.9 G/DL (ref 6–8.2)
PSA SERPL-MCNC: 2.25 NG/ML (ref 0–4)
RBC # BLD AUTO: 6.33 M/UL (ref 4.7–6.1)
SODIUM SERPL-SCNC: 136 MMOL/L (ref 135–145)
TRIGL SERPL-MCNC: 244 MG/DL (ref 0–149)
TSH SERPL DL<=0.005 MIU/L-ACNC: 3.68 UIU/ML (ref 0.38–5.33)
WBC # BLD AUTO: 13.4 K/UL (ref 4.8–10.8)

## 2020-06-26 PROCEDURE — 36415 COLL VENOUS BLD VENIPUNCTURE: CPT

## 2020-06-26 PROCEDURE — 80061 LIPID PANEL: CPT

## 2020-06-26 PROCEDURE — 85025 COMPLETE CBC W/AUTO DIFF WBC: CPT

## 2020-06-26 PROCEDURE — 84153 ASSAY OF PSA TOTAL: CPT

## 2020-06-26 PROCEDURE — 84443 ASSAY THYROID STIM HORMONE: CPT

## 2020-06-26 PROCEDURE — 80053 COMPREHEN METABOLIC PANEL: CPT

## 2020-06-26 PROCEDURE — 82306 VITAMIN D 25 HYDROXY: CPT

## 2020-06-28 PROBLEM — E78.49 OTHER HYPERLIPIDEMIA: Status: ACTIVE | Noted: 2020-06-28

## 2020-06-28 PROBLEM — R73.01 ELEVATED FASTING GLUCOSE: Status: ACTIVE | Noted: 2020-06-28

## 2021-03-15 DIAGNOSIS — Z23 NEED FOR VACCINATION: ICD-10-CM

## 2021-04-03 ENCOUNTER — IMMUNIZATION (OUTPATIENT)
Dept: FAMILY PLANNING/WOMEN'S HEALTH CLINIC | Facility: IMMUNIZATION CENTER | Age: 61
End: 2021-04-03
Attending: INTERNAL MEDICINE
Payer: COMMERCIAL

## 2021-04-03 DIAGNOSIS — Z23 NEED FOR VACCINATION: ICD-10-CM

## 2021-04-03 DIAGNOSIS — Z23 ENCOUNTER FOR VACCINATION: Primary | ICD-10-CM

## 2021-04-03 PROCEDURE — 91300 PFIZER SARS-COV-2 VACCINE: CPT

## 2021-04-03 PROCEDURE — 0001A PFIZER SARS-COV-2 VACCINE: CPT

## 2021-04-21 ENCOUNTER — PATIENT OUTREACH (OUTPATIENT)
Dept: FAMILY PLANNING/WOMEN'S HEALTH CLINIC | Facility: IMMUNIZATION CENTER | Age: 61
End: 2021-04-21

## 2021-04-21 NOTE — PROGRESS NOTES
Left patient a message to call us as he has two appointments scheduled for 4/24/21 for the Covid dose 2 vaccine.

## 2021-04-24 ENCOUNTER — IMMUNIZATION (OUTPATIENT)
Dept: FAMILY PLANNING/WOMEN'S HEALTH CLINIC | Facility: IMMUNIZATION CENTER | Age: 61
End: 2021-04-24
Payer: COMMERCIAL

## 2021-04-24 DIAGNOSIS — Z23 ENCOUNTER FOR VACCINATION: Primary | ICD-10-CM

## 2021-04-24 PROCEDURE — 0002A PFIZER SARS-COV-2 VACCINE: CPT

## 2021-04-24 PROCEDURE — 91300 PFIZER SARS-COV-2 VACCINE: CPT

## 2021-07-24 ENCOUNTER — OFFICE VISIT (OUTPATIENT)
Dept: URGENT CARE | Facility: PHYSICIAN GROUP | Age: 61
End: 2021-07-24
Payer: COMMERCIAL

## 2021-07-24 ENCOUNTER — HOSPITAL ENCOUNTER (OUTPATIENT)
Dept: RADIOLOGY | Facility: MEDICAL CENTER | Age: 61
End: 2021-07-24
Attending: NURSE PRACTITIONER
Payer: COMMERCIAL

## 2021-07-24 ENCOUNTER — TELEPHONE (OUTPATIENT)
Dept: URGENT CARE | Facility: CLINIC | Age: 61
End: 2021-07-24

## 2021-07-24 VITALS
SYSTOLIC BLOOD PRESSURE: 110 MMHG | BODY MASS INDEX: 23.1 KG/M2 | DIASTOLIC BLOOD PRESSURE: 74 MMHG | WEIGHT: 165 LBS | TEMPERATURE: 97.8 F | RESPIRATION RATE: 18 BRPM | HEIGHT: 71 IN | HEART RATE: 88 BPM | OXYGEN SATURATION: 97 %

## 2021-07-24 DIAGNOSIS — I82.431 ACUTE DEEP VEIN THROMBOSIS (DVT) OF POPLITEAL VEIN OF RIGHT LOWER EXTREMITY (HCC): ICD-10-CM

## 2021-07-24 DIAGNOSIS — I82.461 ACUTE DEEP VEIN THROMBOSIS (DVT) OF CALF MUSCLE VEIN OF RIGHT LOWER EXTREMITY (HCC): ICD-10-CM

## 2021-07-24 DIAGNOSIS — M79.89 SWELLING OF RIGHT LOWER EXTREMITY: ICD-10-CM

## 2021-07-24 DIAGNOSIS — Z00.00 HEALTH CARE MAINTENANCE: ICD-10-CM

## 2021-07-24 PROCEDURE — 99204 OFFICE O/P NEW MOD 45 MIN: CPT | Performed by: NURSE PRACTITIONER

## 2021-07-24 PROCEDURE — 99999 PR NO CHARGE: CPT | Performed by: NURSE PRACTITIONER

## 2021-07-24 PROCEDURE — 93971 EXTREMITY STUDY: CPT | Mod: RT

## 2021-07-24 ASSESSMENT — ENCOUNTER SYMPTOMS
SENSORY CHANGE: 0
FOCAL WEAKNESS: 1
NECK PAIN: 0
FEVER: 0
BACK PAIN: 0
MYALGIAS: 1
NUMBNESS: 0
FALLS: 0
WEAKNESS: 1
ARTHRALGIAS: 0
PAIN: 1
BRUISES/BLEEDS EASILY: 0
CHILLS: 0

## 2021-07-24 ASSESSMENT — FIBROSIS 4 INDEX: FIB4 SCORE: 1.8

## 2021-07-24 NOTE — PROGRESS NOTES
Subjective:     Dionte Rodriges is a 60 y.o. male who presents for Pain (right leg swelling and painful. X 1 month )      Pain  This is a new problem. The current episode started in the past 7 days (3 days ago Dionte developed pain and swelling of his right lower extremity. 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 The problem occurs constantly. The problem has been gradually worsening. Associated symptoms include myalgias and weakness. Pertinent negatives include no arthralgias, chills, fever, neck pain or numbness. Exacerbated by: Pressure. Treatments tried: Position changes.          Review of Systems   Constitutional: Negative for chills and fever.   Musculoskeletal: Positive for myalgias. Negative for arthralgias, back pain, falls, joint pain and neck pain.   Neurological: Positive for focal weakness and weakness. Negative for sensory change and numbness.   Endo/Heme/Allergies: Does not bruise/bleed easily.       PMH:   Past Medical History:   Diagnosis Date   • GERD (gastroesophageal reflux disease)     intermittent GERD, tums.   • Other hyperlipidemia 6/28/2020   • Panlobular emphysema (HCC) 7/13/2017   • Substance abuse (HCC)     hx of etoh; quite 1980s     ALLERGIES: No Known Allergies  SURGHX:   Past Surgical History:   Procedure Laterality Date   • HERNIA REPAIR  2000    right inguinal    • OTHER ABDOMINAL SURGERY      hernia sx     SOCHX:   Social History     Socioeconomic History   • Marital status:      Spouse name: Not on file   • Number of children: Not on file   • Years of education: Not on file   • Highest education level: Not on file   Occupational History   • Not on file   Tobacco Use   • Smoking status: Current Every Day Smoker     Packs/day: 1.00     Years: 45.00     Pack years: 45.00     Types: Cigarettes   • Smokeless tobacco: Never Used   • Tobacco comment: Started at 45   Substance and Sexual Activity   • Alcohol use: No     Alcohol/week: 0.0 oz     Comment: quite  "12/3/1987   • Drug use: No   • Sexual activity: Yes     Partners: Female     Comment:    Other Topics Concern   • Not on file   Social History Narrative   • Not on file     Social Determinants of Health     Financial Resource Strain:    • Difficulty of Paying Living Expenses:    Food Insecurity:    • Worried About Running Out of Food in the Last Year:    • Ran Out of Food in the Last Year:    Transportation Needs:    • Lack of Transportation (Medical):    • Lack of Transportation (Non-Medical):    Physical Activity:    • Days of Exercise per Week:    • Minutes of Exercise per Session:    Stress:    • Feeling of Stress :    Social Connections:    • Frequency of Communication with Friends and Family:    • Frequency of Social Gatherings with Friends and Family:    • Attends Synagogue Services:    • Active Member of Clubs or Organizations:    • Attends Club or Organization Meetings:    • Marital Status:    Intimate Partner Violence:    • Fear of Current or Ex-Partner:    • Emotionally Abused:    • Physically Abused:    • Sexually Abused:      FH:   Family History   Problem Relation Age of Onset   • Stroke Mother         massive   • Diabetes Father    • Alzheimer's Disease Maternal Grandmother    • Diabetes Maternal Grandfather    • Heart Attack Maternal Grandfather    • Cancer Neg Hx          Objective:   /74   Pulse 88   Temp 36.6 °C (97.8 °F) (Temporal)   Resp 18   Ht 1.803 m (5' 11\")   Wt 74.8 kg (165 lb)   SpO2 97%   BMI 23.01 kg/m²     Physical Exam  Vitals and nursing note reviewed.   Constitutional:       General: He is not in acute distress.     Appearance: Normal appearance. He is not ill-appearing.   HENT:      Head: Normocephalic and atraumatic.      Right Ear: External ear normal.      Left Ear: External ear normal.      Nose: No congestion or rhinorrhea.      Mouth/Throat:      Mouth: Mucous membranes are moist.   Eyes:      Extraocular Movements: Extraocular movements intact.      " Pupils: Pupils are equal, round, and reactive to light.   Cardiovascular:      Rate and Rhythm: Normal rate and regular rhythm.      Pulses: Normal pulses.      Heart sounds: Normal heart sounds.   Pulmonary:      Effort: Pulmonary effort is normal. No respiratory distress.      Breath sounds: Normal breath sounds. No stridor. No wheezing, rhonchi or rales.   Chest:      Chest wall: No tenderness.   Abdominal:      General: Abdomen is flat. Bowel sounds are normal.      Palpations: Abdomen is soft.      Tenderness: There is no abdominal tenderness. There is no right CVA tenderness or left CVA tenderness.   Musculoskeletal:         General: Normal range of motion.      Cervical back: Normal range of motion and neck supple.        Legs:       Comments: Positive for pain and swelling starting at posterior knee down to right ankle.  Negative for erythema or heat.  Positive Homans' sign.  Bilateral pedal pulses +2   Skin:     General: Skin is warm and dry.      Capillary Refill: Capillary refill takes less than 2 seconds.   Neurological:      General: No focal deficit present.      Mental Status: He is alert and oriented to person, place, and time. Mental status is at baseline.   Psychiatric:         Mood and Affect: Mood normal.         Behavior: Behavior normal.         Thought Content: Thought content normal.         Judgment: Judgment normal.         Assessment/Plan:   Assessment      1. Swelling of right lower extremity  US-EXTREMITY VENOUS LOWER UNILAT LEFT    rivaroxaban (XARELTO) tablet 15 mg   There is significant concern for DVT of his right calf.  He was sent for stat ultrasound Doppler of right lower extremity.  Due to concern for DVT he was given 15 mg of Xarelto in clinic.  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  He was educated that if DVT is positive, he will be required to be cleared by vascular before returning to job.  He verbalized understanding.  Strict ER precautions given.

## 2021-07-24 NOTE — PATIENT INSTRUCTIONS
Bleeding Precautions When on Anticoagulant Therapy, Adult  Anticoagulant therapy, also called blood thinner therapy, is medicine that helps to prevent and treat blood clots. The medicine works by stopping blood clots from forming or growing. Blood clots that form in your blood vessels can be dangerous. They can break loose and travel to the heart, lungs, or brain. This increases the risk of a heart attack, stroke, or blocked lung artery (pulmonary embolism).  Anticoagulants also increase the risk of bleeding. Try to protect yourself from cuts and other injuries that can cause bleeding. It is important to take anticoagulants exactly as told by your health care provider.  Why do I need to be on anticoagulant therapy?  You may need this medicine if you are at risk of developing a blood clot. Conditions that increase your risk of a blood clot include:  · Being born with heart disease or a heart malformation (congenital heart disease).  · Developing heart disease.  · Having had surgery, such as valve replacement.  · Having had a serious accident or other type of severe injury (trauma).  · Having certain types of cancer.  · Having certain diseases that can increase blood clotting.  · Having a high risk of stroke or heart attack.  · Having atrial fibrillation (AF).  What are the common anticoagulant medicines?  There are several types of anticoagulant medicines. The most common types are:  · Medicines that you take by mouth (oral medicines), such as:  ? Warfarin.  ? Novel oral anticoagulants (NOACs), such as:  ? Direct thrombin inhibitors (dabigatran).  ? Factor Xa inhibitors (apixaban, edoxaban, and rivaroxaban).  · Injections, such as:  ? Unfractionated heparin.  ? Low molecular weight heparin.  These anticoagulants work in different ways to prevent blood clots. They also have different risks and side effects.  What do I need to remember while on anticoagulant therapy?  Taking anticoagulants  · Take your medicine at the  same time every day. If you forget to take your medicine, take it as soon as you remember. Do not double your dosage of medicine if you miss a whole day. Take your normal dose and call your health care provider.  · Do not stop taking your medicine unless your health care provider approves. Stopping the medicine can increase your risk of developing a blood clot.  Taking other medicines  · Take over-the-counter and prescriptions medicines only as told by your health care provider.  · Do not take over-the-counter NSAIDs, including aspirin and ibuprofen, while you are on anticoagulant therapy. These medicines increase your risk of dangerous bleeding.  · Get approval from your health care provider before you start taking any new medicines, vitamins, or herbal products. Some of these could interfere with your therapy.  General instructions  · Keep all follow-up visits as told by your health care provider. This is important.  · If you are pregnant or trying to get pregnant, talk with a health care provider about anticoagulants. Some of these medicines are not safe to take during pregnancy.  · Tell all health care providers, including your dentist, that you are on anticoagulant therapy. It is especially important to tell providers before you have any surgery, medical procedures, or dental work done.  What precautions should I take?    · Be very careful when using knives, scissors, or other sharp objects.  · Use an electric razor instead of a blade.  · Do not use toothpicks.  · Use a soft-bristled toothbrush. Brush your teeth gently.  · Always wear shoes outdoors and wear slippers indoors.  · Be careful when cutting your fingernails and toenails.  · Place bath mats in the bathroom. If possible, install handrails as well.  · Wear gloves while you do yard work.  · Wear your seat belt.  · Prevent falls by removing loose rugs and extension cords from areas where you walk. Use a cane or walker if you need it.  · Avoid  constipation by:  ? Drinking enough fluid to keep your urine clear or pale yellow.  ? Eating foods that are high in fiber, such as fresh fruits and vegetables, whole grains, and beans.  ? Limiting foods that are high in fat and processed sugars, such as fried and sweet foods.  · Do not play contact sports or participate in other activities that have a high risk for injury.  What other precautions are important if on warfarin therapy?  If you are taking a type of anticoagulant called warfarin, make sure you:  · Work with a diet and nutrition specialist (dietitian) to make an eating plan. Do not make any sudden changes to your diet after you have started your eating plan.  · Do not drink alcohol. It can interfere with your medicine and increase your risk of an injury that causes bleeding.  · Get regular blood tests as told by your health care provider.  What are some questions to ask my health care provider?  · Why do I need anticoagulant therapy?  · What is the best anticoagulant therapy for my condition?  · How long will I need anticoagulant therapy?  · What are the side effects of anticoagulant therapy?  · When should I take my medicine? What should I do if I forget to take it?  · Will I need to have regular blood tests?  · Do I need to change my diet? Are there foods or drinks that I should avoid?  · What activities are safe for me?  · What should I do if I want to get pregnant?  Contact a health care provider if:  · You miss a dose of medicine:  ? And you are not sure what to do.  ? For more than one day.  · You have:  ? Menstrual bleeding that is heavier than normal.  ? Bloody or brown urine.  ? Easy bruising.  ? Black and tarry stool or bright red stool.  ? Side effects from your medicine.  · You feel weak or dizzy.  · You become pregnant.  Get help right away if:  · You have bleeding that will not stop within 20 minutes from:  ? The nose.  ? The gums.  ? A cut on the skin.  · You have a severe headache or  stomachache.  · You vomit or cough up blood.  · You fall or hit your head.  Summary  · Anticoagulant therapy, also called blood thinner therapy, is medicine that helps to prevent and treat blood clots.  · Anticoagulants work in different ways to prevent blood clots. They also have different risks and side effects.  · Talk with your health care provider about any precautions that you should take while on anticoagulant therapy.  This information is not intended to replace advice given to you by your health care provider. Make sure you discuss any questions you have with your health care provider.  Document Released: 11/28/2016 Document Revised: 04/08/2020 Document Reviewed: 03/06/2018  [employer] Patient Education © 2020 [employer]  Deep Vein Thrombosis    Deep vein thrombosis (DVT) is a condition in which a blood clot forms in a deep vein, such as a lower leg, thigh, or arm vein. A clot is blood that has thickened into a gel or solid. This condition is dangerous. It can lead to serious and even life-threatening complications if the clot travels to the lungs and causes a blockage (pulmonary embolism). It can also damage veins in the leg. This can result in leg pain, swelling, discoloration, and sores (post-thrombotic syndrome).  What are the causes?  This condition may be caused by:  · A slowdown of blood flow.  · Damage to a vein.  · A condition that causes blood to clot more easily, such as an inherited clotting disorder.  What increases the risk?  The following factors may make you more likely to develop this condition:  · Being overweight.  · Being older, especially over age 60.  · Sitting or lying down for more than four hours.  · Being in the hospital.  · Lack of physical activity (sedentary lifestyle).  · Pregnancy, being in childbirth, or having recently given birth.  · Taking medicines that contain estrogen, such as medicines to prevent pregnancy.  · Smoking.  · A history of any of the following:  ? Blood  clots or a blood clotting disease.  ? Peripheral vascular disease.  ? Inflammatory bowel disease.  ? Cancer.  ? Heart disease.  ? Genetic conditions that affect how your blood clots, such as Factor V Leiden mutation.  ? Neurological diseases that affect your legs (leg paresis).  ? A recent injury, such as a car accident.  ? Major or lengthy surgery.  ? A central line placed inside a large vein.  What are the signs or symptoms?  Symptoms of this condition include:  · Swelling, pain, or tenderness in an arm or leg.  · Warmth, redness, or discoloration in an arm or leg.  If the clot is in your leg, symptoms may be more noticeable or worse when you stand or walk. Some people may not develop any symptoms.  How is this diagnosed?  This condition is diagnosed with:  · A medical history and physical exam.  · Tests, such as:  ? Blood tests. These are done to check how well your blood clots.  ? Ultrasound. This is done to check for clots.  ? Venogram. For this test, contrast dye is injected into a vein and X-rays are taken to check for any clots.  How is this treated?  Treatment for this condition depends on:  · The cause of your DVT.  · Your risk for bleeding or developing more clots.  · Any other medical conditions that you have.  Treatment may include:  · Taking a blood thinner (anticoagulant). This type of medicine prevents clots from forming. It may be taken by mouth, injected under the skin, or injected through an IV (catheter).  · Injecting clot-dissolving medicines into the affected vein (catheter-directed thrombolysis).  · Having surgery. Surgery may be done to:  ? Remove the clot.  ? Place a filter in a large vein to catch blood clots before they reach the lungs.  Some treatments may be continued for up to six months.  Follow these instructions at home:  If you are taking blood thinners:  · Take the medicine exactly as told by your health care provider. Some blood thinners need to be taken at the same time every  day. Do not skip a dose.  · Talk with your health care provider before you take any medicines that contain aspirin or NSAIDs. These medicines increase your risk for dangerous bleeding.  · Ask your health care provider about foods and drugs that could change the way the medicine works (may interact). Avoid those things if your health care provider tells you to do so.  · Blood thinners can cause easy bruising and may make it difficult to stop bleeding. Because of this:  ? Be very careful when using knives, scissors, or other sharp objects.  ? Use an electric razor instead of a blade.  ? Avoid activities that could cause injury or bruising, and follow instructions about how to prevent falls.  · Wear a medical alert bracelet or carry a card that lists what medicines you take.  General instructions  · Take over-the-counter and prescription medicines only as told by your health care provider.  · Return to your normal activities as told by your health care provider. Ask your health care provider what activities are safe for you.  · Wear compression stockings if recommended by your health care provider.  · Keep all follow-up visits as told by your health care provider. This is important.  How is this prevented?  To lower your risk of developing this condition again:  · For 30 or more minutes every day, do an activity that:  ? Involves moving your arms and legs.  ? Increases your heart rate.  · When traveling for longer than four hours:  ? Exercise your arms and legs every hour.  ? Drink plenty of water.  ? Avoid drinking alcohol.  · Avoid sitting or lying for a long time without moving your legs.  · If you have surgery or you are hospitalized, ask about ways to prevent blood clots. These may include taking frequent walks or using anticoagulants.  · Stay at a healthy weight.  · If you are a woman who is older than age 35, avoid unnecessary use of medicines that contain estrogen, such as some birth control pills.  · Do not  use any products that contain nicotine or tobacco, such as cigarettes and e-cigarettes. This is especially important if you take estrogen medicines. If you need help quitting, ask your health care provider.  Contact a health care provider if:  · You miss a dose of your blood thinner.  · Your menstrual period is heavier than usual.  · You have unusual bruising.  Get help right away if:  · You have:  ? New or increased pain, swelling, or redness in an arm or leg.  ? Numbness or tingling in an arm or leg.  ? Shortness of breath.  ? Chest pain.  ? A rapid or irregular heartbeat.  ? A severe headache or confusion.  ? A cut that will not stop bleeding.  · There is blood in your vomit, stool, or urine.  · You have a serious fall or accident, or you hit your head.  · You feel light-headed or dizzy.  · You cough up blood.  These symptoms may represent a serious problem that is an emergency. Do not wait to see if the symptoms will go away. Get medical help right away. Call your local emergency services (911 in the [place] Do not drive yourself to the hospital.  Summary  · Deep vein thrombosis (DVT) is a condition in which a blood clot forms in a deep vein, such as a lower leg, thigh, or arm vein.  · Symptoms can include swelling, warmth, pain, and redness in your leg or arm.  · This condition may be treated with a blood thinner (anticoagulant medicine), medicine that is injected to dissolve blood clots,compression stockings, or surgery.  · If you are prescribed blood thinners, take them exactly as told.  This information is not intended to replace advice given to you by your health care provider. Make sure you discuss any questions you have with your health care provider.  Document Released: 12/18/2006 Document Revised: 11/30/2018 Document Reviewed: 05/18/2018  [employer] Patient Education © 2020 [employer]

## 2021-07-26 ENCOUNTER — OFFICE VISIT (OUTPATIENT)
Dept: MEDICAL GROUP | Facility: PHYSICIAN GROUP | Age: 61
End: 2021-07-26
Payer: COMMERCIAL

## 2021-07-26 VITALS
WEIGHT: 172 LBS | OXYGEN SATURATION: 93 % | HEIGHT: 71 IN | SYSTOLIC BLOOD PRESSURE: 110 MMHG | BODY MASS INDEX: 24.08 KG/M2 | TEMPERATURE: 97.7 F | DIASTOLIC BLOOD PRESSURE: 68 MMHG | RESPIRATION RATE: 16 BRPM | HEART RATE: 87 BPM

## 2021-07-26 DIAGNOSIS — R73.01 ELEVATED FASTING GLUCOSE: ICD-10-CM

## 2021-07-26 DIAGNOSIS — Z77.090 ASBESTOS EXPOSURE: ICD-10-CM

## 2021-07-26 DIAGNOSIS — E55.9 VITAMIN D DEFICIENCY: ICD-10-CM

## 2021-07-26 DIAGNOSIS — I82.4Y1 ACUTE DEEP VEIN THROMBOSIS (DVT) OF PROXIMAL VEIN OF RIGHT LOWER EXTREMITY (HCC): ICD-10-CM

## 2021-07-26 DIAGNOSIS — E78.49 OTHER HYPERLIPIDEMIA: ICD-10-CM

## 2021-07-26 DIAGNOSIS — J43.1 PANLOBULAR EMPHYSEMA (HCC): ICD-10-CM

## 2021-07-26 DIAGNOSIS — F17.200 SMOKER: ICD-10-CM

## 2021-07-26 DIAGNOSIS — Z83.3 FAMILY HISTORY OF DIABETES MELLITUS: ICD-10-CM

## 2021-07-26 PROBLEM — Z86.39 H/O HYPOGLYCEMIA: Status: RESOLVED | Noted: 2017-04-24 | Resolved: 2021-07-26

## 2021-07-26 PROBLEM — I82.409 DVT (DEEP VENOUS THROMBOSIS) (HCC): Status: ACTIVE | Noted: 2021-07-26

## 2021-07-26 PROCEDURE — 99214 OFFICE O/P EST MOD 30 MIN: CPT | Performed by: NURSE PRACTITIONER

## 2021-07-26 ASSESSMENT — PATIENT HEALTH QUESTIONNAIRE - PHQ9: CLINICAL INTERPRETATION OF PHQ2 SCORE: 0

## 2021-07-26 ASSESSMENT — FIBROSIS 4 INDEX: FIB4 SCORE: 1.8

## 2021-07-26 NOTE — PATIENT INSTRUCTIONS
Vascular Surgery      [facility] [address]  [place] [address]  Phone: [phone]   Fax: N/A

## 2021-07-26 NOTE — ASSESSMENT & PLAN NOTE
Pt was recently diagnosed with a blood clot in his lower extremity. He is on xarelto 15mg. A referral has been placed for vascular. He is a , he needs paperwork filled out.   He reports that he still has mild swelling of his right lower extremity.  He states that today it is quite painful and he is walking with a limp.  Urgent referral has been placed to vascular.  Patient has not heard from their office to schedule quite yet.      Continue Xarelto 2 times daily.  Follow-up with vascular.  Return for [facility] paperwork.

## 2021-07-26 NOTE — ASSESSMENT & PLAN NOTE
This is a chronic issue. He has a smoking history as well as exposure to asbestos.  He denies using any inhaler including a short acting albuterol.  He states that he very rarely feels short of breath.  He denies SOB, cough.     Patient receives a CT scan every year due to exposure to a specialist.  We will continue monitoring CT scans.

## 2021-07-26 NOTE — PROGRESS NOTES
"      CC:  Establish care                                                                                                                                 HPI:   Dionte presents today with the following.    Problem   Dvt (Deep Venous Thrombosis) (Hcc)   Asbestos Exposure   Panlobular emphysema (HCC)   Smoker   H/O Hypoglycemia (Resolved)   Hemorrhoids (Resolved)       Current Outpatient Medications   Medication Sig Dispense Refill   • rivaroxaban (XARELTO) 15 MG Tab tablet Take 1 tablet by mouth 2 times a day for 21 days. 42 tablet 0   • Multiple Vitamins-Minerals (CENTRUM SILVER PO) Take  by mouth.       No current facility-administered medications for this visit.       Allergies as of 07/26/2021   • (No Known Allergies)        ROS:  Gen: no fevers/chills, no changes in weight  Pulm: no sob, no cough  CV: no chest pain, no palpitations   GI: no nausea/vomiting, no diarrhea  MSk: + myalgias  Neuro: no headaches, no numbness/tingling    /68 (BP Location: Right arm, Patient Position: Sitting, BP Cuff Size: Adult)   Pulse 87   Temp 36.5 °C (97.7 °F) (Temporal)   Resp 16   Ht 1.803 m (5' 11\")   Wt 78 kg (172 lb)   SpO2 93%   BMI 23.99 kg/m²     Physical Exam:  Gen:         Alert and oriented, No apparent distress.  Neck:        No Lymphadenopathy.   Lungs:     Clear to auscultation bilaterally. No wheezes, rales, or rhonchi.   CV:          Regular rate and rhythm. No murmurs, rubs or gallops.         Ext:          No clubbing, cyanosis, or peripheral edema.  Skin:  All visible skin intact without lesions.     Assessment and Plan:   60 y.o. male with the following issues.      1. Panlobular emphysema (HCC)  CT-CHEST (THORAX) W/O    CBC WITHOUT DIFFERENTIAL   2. Acute deep vein thrombosis (DVT) of proximal vein of right lower extremity (HCC)  Comp Metabolic Panel   3. Asbestos exposure  CT-CHEST (THORAX) W/O   4. Family history of diabetes mellitus (DM)  HEMOGLOBIN A1C   5. Elevated fasting glucose  Comp " "Metabolic Panel    HEMOGLOBIN A1C   6. Other hyperlipidemia  Lipid Profile    Comp Metabolic Panel   7. Vitamin D deficiency  VITAMIN D,25 HYDROXY   8. Smoker          Panlobular emphysema (HCC)  This is a chronic issue. He has a smoking history as well as exposure to asbestos.  He denies using any inhaler including a short acting albuterol.  He states that he very rarely feels short of breath.  He denies SOB, cough.     Patient receives a CT scan every year due to exposure to a specialist.  We will continue monitoring CT scans.    DVT (deep venous thrombosis) (HCC)  Pt was recently diagnosed with a blood clot in his lower extremity. He is on xarelto 15mg. A referral has been placed for vascular. He is a , he needs paperwork filled out.   He reports that he still has mild swelling of his right lower extremity.  He states that today it is quite painful and he is walking with a limp.  Urgent referral has been placed to vascular.  Patient has not heard from their office to schedule quite yet.      Continue Xarelto 2 times daily.  Follow-up with vascular.  Return for [facility] paperwork.    Asbestos exposure  Pt was exposed to asbestos from [id number]. Pt gets yearly Chest CTs to monitor.  Patient's last CT was in June 2020.    Smoker  Patient has a 45-year pack history of smoking.  He states that this time he does not want to quit.  Patient states that he has decreased his smoking over the last several months.  I encouraged patient to continue gradually decreasing use.  Patient declined smoking cessation counseling at this time.    Health maintenance  Discussed colonoscopy and other health maintenance with patient.  Patient at this time is adamant about not having a colonoscopy performed.  He states that he has the Cologuard test at home, which she has not sent in yet.  Per patient, \"if it happens, it happens\".  Patient is agreeable to obtaining screening labs.    Return in about 3 months (around " 10/26/2021) for follow up for DVT.      I have placed the below orders and discussed them with an approved delegating provider.  The MA is performing the below orders under the direction of Dr. Quiroga.    Please note that this dictation was created using voice recognition software. I have worked with consultants from the vendor as well as technical experts from [facility] to optimize the interface. I have made every reasonable attempt to correct obvious errors, but I expect that there are errors of grammar and possibly content that I did not discover before finalizing the note.

## 2021-07-26 NOTE — ASSESSMENT & PLAN NOTE
Pt was exposed to asbestos from [id number]. Pt gets yearly Chest CTs to monitor.  Patient's last CT was in June 2020.

## 2021-07-26 NOTE — ASSESSMENT & PLAN NOTE
Patient has a 45-year pack history of smoking.  He states that this time he does not want to quit.  Patient states that he has decreased his smoking over the last several months.  I encouraged patient to continue gradually decreasing use.  Patient declined smoking cessation counseling at this time.

## 2021-07-27 ENCOUNTER — OFFICE VISIT (OUTPATIENT)
Dept: MEDICAL GROUP | Facility: PHYSICIAN GROUP | Age: 61
End: 2021-07-27
Payer: COMMERCIAL

## 2021-07-27 VITALS
TEMPERATURE: 97.5 F | RESPIRATION RATE: 16 BRPM | OXYGEN SATURATION: 95 % | DIASTOLIC BLOOD PRESSURE: 66 MMHG | BODY MASS INDEX: 24.08 KG/M2 | WEIGHT: 172 LBS | SYSTOLIC BLOOD PRESSURE: 114 MMHG | HEART RATE: 71 BPM | HEIGHT: 71 IN

## 2021-07-27 DIAGNOSIS — Z02.89 ENCOUNTER FOR COMPLETION OF FORM WITH PATIENT: ICD-10-CM

## 2021-07-27 PROCEDURE — 7101 PR PHYSICAL: Performed by: NURSE PRACTITIONER

## 2021-07-27 ASSESSMENT — FIBROSIS 4 INDEX: FIB4 SCORE: 1.8

## 2021-07-27 NOTE — PROGRESS NOTES
"      CC: ***                                                                                                                                      HPI:   Dionte presents today with the following.    No problems updated.    Current Outpatient Medications   Medication Sig Dispense Refill   • rivaroxaban (XARELTO) 15 MG Tab tablet Take 1 tablet by mouth 2 times a day for 21 days. 42 tablet 0   • Multiple Vitamins-Minerals (CENTRUM SILVER PO) Take  by mouth.       No current facility-administered medications for this visit.       Allergies as of 07/27/2021   • (No Known Allergies)        ROS:  Gen: no fevers/chills, no changes in weight  Eyes: no changes in vision  ENT: no sore throat, no hearing loss, no bloody nose  Pulm: no sob, no cough  CV: no chest pain, no palpitations  GI: no nausea/vomiting, no diarrhea  : no dysuria  MSk: no myalgias  Skin: no rash  Neuro: no headaches, no numbness/tingling  Heme/Lymph: no easy bruising     /66 (BP Location: Right arm, Patient Position: Sitting, BP Cuff Size: Adult)   Pulse 71   Temp 36.4 °C (97.5 °F) (Temporal)   Resp 16   Ht 1.803 m (5' 11\")   Wt 78 kg (172 lb)   SpO2 95%   BMI 23.99 kg/m²     Physical Exam:  Gen:         Alert and oriented, No apparent distress.  Neck:        No Lymphadenopathy.   Lungs:     Clear to auscultation bilaterally. No wheezes, rales, or rhonchi.   CV:          Regular rate and rhythm. No murmurs, rubs or gallops.         Ext:          No clubbing, cyanosis, or peripheral edema.  Skin:  All visible skin intact without lesions.       Assessment and Plan:  60 y.o. male with the following issues.    No diagnosis found.     No problem-specific Assessment & Plan notes found for this encounter.      No follow-ups on file.      I spent a total of *** minutes with record review, exam, and communication with the patient, communication with other providers, and documentation of this encounter. This does not include time spent on separately " billable procedures/tests.      I have placed the below orders and discussed them with an approved delegating provider.  The MA is performing the below orders under the direction of ***.    Please note that this dictation was created using voice recognition software. I have worked with consultants from the vendor as well as technical experts from [facility] to optimize the interface. I have made every reasonable attempt to correct obvious errors, but I expect that there are errors of grammar and possibly content that I did not discover before finalizing the note.

## 2021-07-27 NOTE — PROGRESS NOTES
Patient here for completion of FMLA paperwork.  Paperwork completed with the patient, scanned into patient's chart, and handed directly back to patient.

## 2021-08-05 ENCOUNTER — HOSPITAL ENCOUNTER (OUTPATIENT)
Dept: LAB | Facility: MEDICAL CENTER | Age: 61
End: 2021-08-05
Attending: NURSE PRACTITIONER
Payer: COMMERCIAL

## 2021-08-05 ENCOUNTER — HOSPITAL ENCOUNTER (OUTPATIENT)
Dept: RADIOLOGY | Facility: MEDICAL CENTER | Age: 61
End: 2021-08-05
Attending: NURSE PRACTITIONER
Payer: COMMERCIAL

## 2021-08-05 DIAGNOSIS — J43.1 PANLOBULAR EMPHYSEMA (HCC): ICD-10-CM

## 2021-08-05 DIAGNOSIS — E55.9 VITAMIN D DEFICIENCY: ICD-10-CM

## 2021-08-05 DIAGNOSIS — I82.4Y1 ACUTE DEEP VEIN THROMBOSIS (DVT) OF PROXIMAL VEIN OF RIGHT LOWER EXTREMITY (HCC): ICD-10-CM

## 2021-08-05 DIAGNOSIS — R73.01 ELEVATED FASTING GLUCOSE: ICD-10-CM

## 2021-08-05 DIAGNOSIS — E78.49 OTHER HYPERLIPIDEMIA: ICD-10-CM

## 2021-08-05 DIAGNOSIS — Z83.3 FAMILY HISTORY OF DIABETES MELLITUS: ICD-10-CM

## 2021-08-05 DIAGNOSIS — Z77.090 ASBESTOS EXPOSURE: ICD-10-CM

## 2021-08-05 LAB
25(OH)D3 SERPL-MCNC: 34 NG/ML (ref 30–100)
ALBUMIN SERPL BCP-MCNC: 4.1 G/DL (ref 3.2–4.9)
ALBUMIN/GLOB SERPL: 1.4 G/DL
ALP SERPL-CCNC: 122 U/L (ref 30–99)
ALT SERPL-CCNC: 30 U/L (ref 2–50)
ANION GAP SERPL CALC-SCNC: 11 MMOL/L (ref 7–16)
AST SERPL-CCNC: 28 U/L (ref 12–45)
BILIRUB SERPL-MCNC: 0.7 MG/DL (ref 0.1–1.5)
BUN SERPL-MCNC: 14 MG/DL (ref 8–22)
CALCIUM SERPL-MCNC: 9.7 MG/DL (ref 8.5–10.5)
CHLORIDE SERPL-SCNC: 101 MMOL/L (ref 96–112)
CHOLEST SERPL-MCNC: 195 MG/DL (ref 100–199)
CO2 SERPL-SCNC: 23 MMOL/L (ref 20–33)
CREAT SERPL-MCNC: 1.19 MG/DL (ref 0.5–1.4)
ERYTHROCYTE [DISTWIDTH] IN BLOOD BY AUTOMATED COUNT: 39.9 FL (ref 35.9–50)
EST. AVERAGE GLUCOSE BLD GHB EST-MCNC: 114 MG/DL
GLOBULIN SER CALC-MCNC: 3 G/DL (ref 1.9–3.5)
GLUCOSE SERPL-MCNC: 95 MG/DL (ref 65–99)
HBA1C MFR BLD: 5.6 % (ref 4–5.6)
HCT VFR BLD AUTO: 56.1 % (ref 42–52)
HDLC SERPL-MCNC: 34 MG/DL
HGB BLD-MCNC: 19.1 G/DL (ref 14–18)
LDLC SERPL CALC-MCNC: 118 MG/DL
MCH RBC QN AUTO: 31 PG (ref 27–33)
MCHC RBC AUTO-ENTMCNC: 34 G/DL (ref 33.7–35.3)
MCV RBC AUTO: 90.9 FL (ref 81.4–97.8)
PLATELET # BLD AUTO: 207 K/UL (ref 164–446)
PMV BLD AUTO: 10.9 FL (ref 9–12.9)
POTASSIUM SERPL-SCNC: 4.3 MMOL/L (ref 3.6–5.5)
PROT SERPL-MCNC: 7.1 G/DL (ref 6–8.2)
RBC # BLD AUTO: 6.17 M/UL (ref 4.7–6.1)
SODIUM SERPL-SCNC: 135 MMOL/L (ref 135–145)
TRIGL SERPL-MCNC: 215 MG/DL (ref 0–149)
WBC # BLD AUTO: 12.2 K/UL (ref 4.8–10.8)

## 2021-08-05 PROCEDURE — 36415 COLL VENOUS BLD VENIPUNCTURE: CPT

## 2021-08-05 PROCEDURE — 80061 LIPID PANEL: CPT

## 2021-08-05 PROCEDURE — 83036 HEMOGLOBIN GLYCOSYLATED A1C: CPT

## 2021-08-05 PROCEDURE — 80053 COMPREHEN METABOLIC PANEL: CPT

## 2021-08-05 PROCEDURE — 85027 COMPLETE CBC AUTOMATED: CPT

## 2021-08-05 PROCEDURE — 71250 CT THORAX DX C-: CPT

## 2021-08-05 PROCEDURE — 82306 VITAMIN D 25 HYDROXY: CPT

## 2021-08-06 DIAGNOSIS — I82.431 ACUTE DEEP VEIN THROMBOSIS (DVT) OF POPLITEAL VEIN OF RIGHT LOWER EXTREMITY (HCC): ICD-10-CM

## 2021-08-09 ENCOUNTER — TELEPHONE (OUTPATIENT)
Dept: MEDICAL GROUP | Facility: PHYSICIAN GROUP | Age: 61
End: 2021-08-09

## 2021-08-09 NOTE — TELEPHONE ENCOUNTER
VOICEMAIL  1. Caller Name: Dionte Rodriges                        Call Back Number: [phone] (home) [phone] (work)      2. Message: Pt would like to know when  he would be able to go to back to work    3. Patient approves office to leave a detailed voicemail/MyChart message: yesyes

## 2021-08-10 NOTE — TELEPHONE ENCOUNTER
Phone Number Called: [phone] (home) [phone] (work)      Call outcome: Left detailed message for patient. Informed to call back with any additional questions.    Message: left vm

## 2021-08-12 ENCOUNTER — OFFICE VISIT (OUTPATIENT)
Dept: URGENT CARE | Facility: PHYSICIAN GROUP | Age: 61
End: 2021-08-12

## 2021-08-12 VITALS
TEMPERATURE: 97.9 F | OXYGEN SATURATION: 92 % | DIASTOLIC BLOOD PRESSURE: 62 MMHG | WEIGHT: 164 LBS | HEIGHT: 71 IN | HEART RATE: 76 BPM | BODY MASS INDEX: 22.96 KG/M2 | SYSTOLIC BLOOD PRESSURE: 108 MMHG

## 2021-08-12 DIAGNOSIS — Z02.89 ENCOUNTER FOR EXAMINATION REQUIRED BY DEPARTMENT OF TRANSPORTATION (DOT): ICD-10-CM

## 2021-08-12 DIAGNOSIS — I82.461 ACUTE DEEP VEIN THROMBOSIS (DVT) OF CALF MUSCLE VEIN OF RIGHT LOWER EXTREMITY (HCC): ICD-10-CM

## 2021-08-12 PROCEDURE — 99213 OFFICE O/P EST LOW 20 MIN: CPT | Performed by: PHYSICIAN ASSISTANT

## 2021-08-12 ASSESSMENT — FIBROSIS 4 INDEX: FIB4 SCORE: 1.48

## 2021-08-12 NOTE — PROGRESS NOTES
"Subjective:      Dionte Rodriges is a 60 y.o. male who presents with Other (DOT)    Medications:    • CENTRUM SILVER PO  • rivaroxaban Tabs    Allergies: Patient has no known allergies.    Problem List: Dionte Rodriges does not have any pertinent problems on file.    Surgical History:  Past Surgical History:   Procedure Laterality Date   • HERNIA REPAIR  2000    right inguinal    • OPEN REDUCTION     • OTHER ABDOMINAL SURGERY      hernia sx       Past Social Hx: Dionte Rodriges  reports that he has been smoking cigarettes. He has a 45.00 pack-year smoking history. He has never used smokeless tobacco. He reports that he does not drink alcohol and does not use drugs.     Past Family Hx:  Dionte Rodriges family history includes Alzheimer's Disease in his maternal grandmother; Diabetes in his father and maternal grandfather; Heart Attack in his maternal grandfather; Stroke in his mother.     Problem list, medications, and allergies reviewed by myself today in Epic.           DOT exam to return to work. Pt dx recently with DVT of his right calf.  Patient is on Xarelto which he is tolerating without issue.  Patient states his calf is no longer red, swollen, warm or painful.  Patient is ambulating without difficulty.  Patient is here to be cleared so he can return back to work.    Other  This is a new problem. The problem has been rapidly improving. Nothing aggravates the symptoms. He has tried nothing for the symptoms.       Review of Systems   All other systems reviewed and are negative.         Objective:     /62 (BP Location: Right arm, Patient Position: Sitting, BP Cuff Size: Adult)   Pulse 76   Temp 36.6 °C (97.9 °F) (Temporal)   Ht 1.803 m (5' 11\")   Wt 74.4 kg (164 lb)   SpO2 92%   BMI 22.87 kg/m²      Physical Exam  Vitals and nursing note reviewed.   Constitutional:       General: He is not in acute distress.     Appearance: Normal appearance. He is well-developed and normal weight. He is not " ill-appearing or toxic-appearing.   HENT:      Head: Normocephalic and atraumatic.      Right Ear: Tympanic membrane normal.      Left Ear: Tympanic membrane normal.      Nose: Nose normal.      Mouth/Throat:      Lips: Pink.      Mouth: Mucous membranes are moist.      Pharynx: Oropharynx is clear. Uvula midline.   Eyes:      Extraocular Movements: Extraocular movements intact.      Conjunctiva/sclera: Conjunctivae normal.      Pupils: Pupils are equal, round, and reactive to light.   Cardiovascular:      Rate and Rhythm: Normal rate and regular rhythm.      Pulses: Normal pulses.      Heart sounds: Normal heart sounds.   Pulmonary:      Effort: Pulmonary effort is normal.      Breath sounds: Normal breath sounds.   Abdominal:      General: Bowel sounds are normal.      Palpations: Abdomen is soft.   Musculoskeletal:         General: Normal range of motion.      Cervical back: Normal range of motion and neck supple.      Right lower leg: No swelling or tenderness. No edema.      Comments: Right calf is nonswollen, and nontender to palpation.  Skin is normal in color, temperature.  Patient ambulates normally without change in gait.  Distal neurovascular is intact.   Skin:     General: Skin is warm and dry.      Capillary Refill: Capillary refill takes less than 2 seconds.   Neurological:      General: No focal deficit present.      Mental Status: He is alert and oriented to person, place, and time.      Cranial Nerves: No cranial nerve deficit.      Motor: Motor function is intact.      Coordination: Coordination is intact.      Gait: Gait normal.   Psychiatric:         Mood and Affect: Mood normal.                        Assessment/Plan:         1. Encounter for examination required by Department of Transportation (DOT)     2. Acute deep vein thrombosis (DVT) of calf muscle vein of right lower extremity (HCC)        Patient was evaluated in clinic today while wearing appropriate personal protective equipment.       DOT recommendation for patients who are on antiplatelet therapy for DVT is a case-by-case basis based on patient's tolerance of medication as well as clinical exam.  Patient's clinical exam is totally normal Is the same when compared to the other calf.  Leg is nontender to palpation, normal in color normal and temperature normal in size.  Distal neurovascular is intact.  Patient has full range of motion of right lower extremity.    Patient was cleared to return to work without restrictions.    I have spent at least 30 minutes on the care of this patient.  This includes preparing for visit which includes review of previous visits if available in EMR, obtaining HPI, exam and evaluation of patient, ordering and independent interpretation of labs, imaging, tests, medical management, counseling, education and documentation.

## 2021-08-12 NOTE — LETTER
August 12, 2021         Patient: Dionte Rodriges   YOB: 1960   Date of Visit: 8/12/2021           To Whom it May Concern:    Dionte Rodriges was seen in my clinic on 8/12/2021. He has been cleared to return to work without restrictions.    If you have any questions or concerns, please don't hesitate to call.        Sincerely,           Harriet Hernandez P.A.-C.  Electronically Signed

## 2021-10-11 ENCOUNTER — OFFICE VISIT (OUTPATIENT)
Dept: MEDICAL GROUP | Facility: PHYSICIAN GROUP | Age: 61
End: 2021-10-11
Payer: COMMERCIAL

## 2021-10-11 VITALS
DIASTOLIC BLOOD PRESSURE: 74 MMHG | HEIGHT: 71 IN | OXYGEN SATURATION: 94 % | TEMPERATURE: 97.3 F | RESPIRATION RATE: 16 BRPM | BODY MASS INDEX: 23.94 KG/M2 | WEIGHT: 171 LBS | HEART RATE: 95 BPM | SYSTOLIC BLOOD PRESSURE: 116 MMHG

## 2021-10-11 DIAGNOSIS — Z82.49 FAMILY HISTORY OF BLOOD CLOTS: ICD-10-CM

## 2021-10-11 DIAGNOSIS — Z23 NEED FOR VACCINATION: ICD-10-CM

## 2021-10-11 DIAGNOSIS — I82.4Y1 ACUTE DEEP VEIN THROMBOSIS (DVT) OF PROXIMAL VEIN OF RIGHT LOWER EXTREMITY (HCC): ICD-10-CM

## 2021-10-11 PROBLEM — K08.9 POOR DENTITION: Status: RESOLVED | Noted: 2017-04-24 | Resolved: 2021-10-11

## 2021-10-11 PROCEDURE — 90471 IMMUNIZATION ADMIN: CPT | Performed by: NURSE PRACTITIONER

## 2021-10-11 PROCEDURE — 99214 OFFICE O/P EST MOD 30 MIN: CPT | Mod: 25 | Performed by: NURSE PRACTITIONER

## 2021-10-11 PROCEDURE — 90686 IIV4 VACC NO PRSV 0.5 ML IM: CPT | Performed by: NURSE PRACTITIONER

## 2021-10-11 RX ORDER — RIVAROXABAN 20 MG/1
20 TABLET, FILM COATED ORAL
COMMUNITY
Start: 2021-09-12 | End: 2021-10-11

## 2021-10-11 ASSESSMENT — FIBROSIS 4 INDEX: FIB4 SCORE: 1.48

## 2021-10-11 NOTE — ASSESSMENT & PLAN NOTE
This is a chronic issue. He does have an increased risk due to being a . He also reports new knowledge of a family history of blood clot in his dad who was also a . He did try compression socks which were painful and uncomfortable for the patient while driving.     Patient would like to continue anticoagulation for a few more months.     Will decrease the xarelto to 10mg daily. Will also order labs to evaluate for thrombophilia.     Patient to follow up in 3 months.

## 2021-10-11 NOTE — PROGRESS NOTES
"      CC: DVT follow up                                                                                                                                      HPI:   Dionte presents today with the following.    Problem   Dvt (Deep Venous Thrombosis) (Hcc)   Poor Dentition (Resolved)       Current Outpatient Medications   Medication Sig Dispense Refill   • rivaroxaban (XARELTO) 10 MG Tab tablet Take 1 Tablet by mouth with dinner. 90 Tablet 0   • Multiple Vitamins-Minerals (CENTRUM SILVER PO) Take  by mouth.       No current facility-administered medications for this visit.       Allergies as of 10/11/2021   • (No Known Allergies)        ROS:  Gen: no fevers/chills, no changes in weight  Pulm: no sob, no cough  CV: no chest pain, no palpitations  GI: no nausea/vomiting, no diarrhea  Neuro: no headaches, no numbness/tingling      /74 (BP Location: Right arm, Patient Position: Sitting, BP Cuff Size: Adult)   Pulse 95   Temp 36.3 °C (97.3 °F) (Temporal)   Resp 16   Ht 1.803 m (5' 11\")   Wt 77.6 kg (171 lb)   SpO2 94%   BMI 23.85 kg/m²     Physical Exam:  Gen:         Alert and oriented, No apparent distress.  Neck:        No Lymphadenopathy.   Lungs:     Clear to auscultation bilaterally. No wheezes, rales, or rhonchi.   CV:          Regular rate and rhythm. No murmurs, rubs or gallops.         Ext:          No clubbing, cyanosis, or peripheral edema.  Skin:  All visible skin intact without lesions.       Assessment and Plan:  60 y.o. male with the following issues.    1. Need for vaccination  INFLUENZA VACCINE QUAD INJ (PF)   2. Acute deep vein thrombosis (DVT) of proximal vein of right lower extremity (HCC)  APTT    AT-III FUNCTIONAL    FACTOR V LEIDEN MUTATION    FACTOR II DNA ANALYSIS    PROTEIN C FUNCTIONAL    PROTEIN S FUNCTIONAL    Prothrombin Time   3. Family history of blood clots  APTT    AT-III FUNCTIONAL    FACTOR V LEIDEN MUTATION    FACTOR II DNA ANALYSIS    PROTEIN C FUNCTIONAL    PROTEIN S " FUNCTIONAL    Prothrombin Time        DVT (deep venous thrombosis) (HCC)  This is a chronic issue. He does have an increased risk due to being a . He also reports new knowledge of a family history of blood clot in his dad who was also a . He did try compression socks which were painful and uncomfortable for the patient while driving.     Patient would like to continue anticoagulation for a few more months.     Will decrease the xarelto to 10mg daily. Will also order labs to evaluate for thrombophilia.     Patient to follow up in 3 months.       Return in about 3 months (around 1/11/2022) for follow up for DVT.      I have placed the below orders and discussed them with an approved delegating provider.  The MA is performing the below orders under the direction of Dr. Quiroga.    Please note that this dictation was created using voice recognition software. I have worked with consultants from the vendor as well as technical experts from [facility] to optimize the interface. I have made every reasonable attempt to correct obvious errors, but I expect that there are errors of grammar and possibly content that I did not discover before finalizing the note.

## 2021-12-27 ENCOUNTER — OFFICE VISIT (OUTPATIENT)
Dept: MEDICAL GROUP | Facility: PHYSICIAN GROUP | Age: 61
End: 2021-12-27
Payer: COMMERCIAL

## 2021-12-27 VITALS
SYSTOLIC BLOOD PRESSURE: 122 MMHG | OXYGEN SATURATION: 95 % | BODY MASS INDEX: 23.52 KG/M2 | TEMPERATURE: 97.3 F | HEIGHT: 71 IN | WEIGHT: 168 LBS | RESPIRATION RATE: 16 BRPM | HEART RATE: 93 BPM | DIASTOLIC BLOOD PRESSURE: 66 MMHG

## 2021-12-27 DIAGNOSIS — M54.42 ACUTE BILATERAL LOW BACK PAIN WITH LEFT-SIDED SCIATICA: ICD-10-CM

## 2021-12-27 PROCEDURE — 99213 OFFICE O/P EST LOW 20 MIN: CPT | Performed by: NURSE PRACTITIONER

## 2021-12-27 RX ORDER — CYCLOBENZAPRINE HCL 10 MG
10 TABLET ORAL 3 TIMES DAILY PRN
Qty: 30 TABLET | Refills: 1 | Status: SHIPPED | OUTPATIENT
Start: 2021-12-27 | End: 2022-01-17

## 2021-12-27 RX ORDER — MELOXICAM 7.5 MG/1
15 TABLET ORAL DAILY
Qty: 14 TABLET | Refills: 0 | Status: SHIPPED | OUTPATIENT
Start: 2021-12-27 | End: 2022-01-03

## 2021-12-27 ASSESSMENT — FIBROSIS 4 INDEX: FIB4 SCORE: 1.51

## 2021-12-27 NOTE — LETTER
December 27, 2021         Patient: Dionte Rodriges   YOB: 1960   Date of Visit: 12/27/2021           To Whom it May Concern:    Dionte Rodriges was seen in my clinic on 12/27/2021. Due to an acute injury, please excuse patient from work from 12/24/2021. He may return to work on 12/30/2021.    If you have any questions or concerns, please don't hesitate to call.        Sincerely,           SUSY Frye.P.RWilliamsN.  Electronically Signed

## 2021-12-27 NOTE — PROGRESS NOTES
"  CC: acute back pain for 1 week                                                                                                                                 HPI:   Dionte presents today with the following.    Problem   Acute Bilateral Low Back Pain With Left-Sided Sciatica       Current Outpatient Medications   Medication Sig Dispense Refill   • meloxicam (MOBIC) 7.5 MG Tab Take 2 Tablets by mouth every day for 7 days. 14 Tablet 0   • cyclobenzaprine (FLEXERIL) 10 mg Tab Take 1 Tablet by mouth 3 times a day as needed for Moderate Pain or Muscle Spasms. 30 Tablet 1   • rivaroxaban (XARELTO) 10 MG Tab tablet Take 1 Tablet by mouth with dinner. 90 Tablet 0   • Multiple Vitamins-Minerals (CENTRUM SILVER PO) Take  by mouth.       No current facility-administered medications for this visit.       Allergies as of 12/27/2021   • (No Known Allergies)        ROS:  All systems negative expect as addressed in assessment and plan.     /66 (BP Location: Left arm, Patient Position: Sitting, BP Cuff Size: Adult)   Pulse 93   Temp 36.3 °C (97.3 °F) (Temporal)   Resp 16   Ht 1.803 m (5' 11\")   Wt 76.2 kg (168 lb)   SpO2 95%   BMI 23.43 kg/m²     Physical Exam:  Gen:         Alert and oriented, No apparent distress.  Neck:        No Lymphadenopathy.   Lungs:     Clear to auscultation bilaterally. No wheezes, rales, or rhonchi.   CV:          Regular rate and rhythm. No murmurs, rubs or gallops.         Ext:          No clubbing, cyanosis, or peripheral edema.  Skin:  All visible skin intact without lesions.       Assessment and Plan:  61 y.o. male with the following issues.    1. Acute bilateral low back pain with left-sided sciatica  cyclobenzaprine (FLEXERIL) 10 mg Tab        Acute bilateral low back pain with left-sided sciatica  Pt was working in his yard a week ago. He was cutting a fallen tree. He states that when he stood up he felt a pop in his back. Since then he has been in significant pain. He did see a " "chiropractor who took xrays and said his \"back was locked up\". He states that the adjustment did not help. Patient is having a hard time standing and sitting in the exam room. Patient is stiff. He has been having muscle spasms.     He has been trying ice and anti inflammatories without relief.     Will prescribe flexeril and and some as needed pain medication for the next week.     Return if symptoms worsen or fail to improve.    I have placed the below orders and discussed them with an approved delegating provider.  The MA is performing the below orders under the direction of Dr. Quiroga.    Please note that this dictation was created using voice recognition software. I have worked with consultants from the vendor as well as technical experts from [facility] to optimize the interface. I have made every reasonable attempt to correct obvious errors, but I expect that there are errors of grammar and possibly content that I did not discover before finalizing the note.       "

## 2021-12-27 NOTE — ASSESSMENT & PLAN NOTE
"Pt was working in his yard a week ago. He was cutting a fallen tree. He states that when he stood up he felt a pop in his back. Since then he has been in significant pain. He did see a chiropractor who took xrays and said his \"back was locked up\". He states that the adjustment did not help. Patient is having a hard time standing and sitting in the exam room. Patient is stiff. He has been having muscle spasms.     He has been trying ice and anti inflammatories without relief.     Will prescribe flexeril and and some as needed pain medication for the next week.   "

## 2022-01-17 ENCOUNTER — OFFICE VISIT (OUTPATIENT)
Dept: MEDICAL GROUP | Facility: PHYSICIAN GROUP | Age: 62
End: 2022-01-17
Payer: COMMERCIAL

## 2022-01-17 VITALS
TEMPERATURE: 98.2 F | SYSTOLIC BLOOD PRESSURE: 104 MMHG | OXYGEN SATURATION: 94 % | BODY MASS INDEX: 23.77 KG/M2 | WEIGHT: 169.8 LBS | HEART RATE: 95 BPM | DIASTOLIC BLOOD PRESSURE: 66 MMHG | HEIGHT: 71 IN

## 2022-01-17 DIAGNOSIS — Z23 NEED FOR VACCINATION: ICD-10-CM

## 2022-01-17 DIAGNOSIS — M54.42 ACUTE BILATERAL LOW BACK PAIN WITH LEFT-SIDED SCIATICA: ICD-10-CM

## 2022-01-17 DIAGNOSIS — Z86.718 HISTORY OF DVT OF LOWER EXTREMITY: ICD-10-CM

## 2022-01-17 PROCEDURE — 90471 IMMUNIZATION ADMIN: CPT | Performed by: NURSE PRACTITIONER

## 2022-01-17 PROCEDURE — 99214 OFFICE O/P EST MOD 30 MIN: CPT | Mod: 25 | Performed by: NURSE PRACTITIONER

## 2022-01-17 PROCEDURE — 90750 HZV VACC RECOMBINANT IM: CPT | Performed by: NURSE PRACTITIONER

## 2022-01-17 RX ORDER — CYCLOBENZAPRINE HCL 10 MG
10 TABLET ORAL 3 TIMES DAILY PRN
Qty: 30 TABLET | Refills: 1 | Status: SHIPPED | OUTPATIENT
Start: 2022-01-17 | End: 2022-01-17

## 2022-01-17 RX ORDER — CYCLOBENZAPRINE HCL 10 MG
10 TABLET ORAL 3 TIMES DAILY PRN
Qty: 90 TABLET | Refills: 1 | Status: SHIPPED | OUTPATIENT
Start: 2022-01-17 | End: 2022-12-12

## 2022-01-17 ASSESSMENT — PATIENT HEALTH QUESTIONNAIRE - PHQ9: CLINICAL INTERPRETATION OF PHQ2 SCORE: 0

## 2022-01-17 ASSESSMENT — FIBROSIS 4 INDEX: FIB4 SCORE: 1.51

## 2022-01-17 NOTE — ASSESSMENT & PLAN NOTE
Patient reports that this has improved. He still uses the flexeril as needed. He also uses tylenol as needed.     Continue as needed medications.

## 2022-01-17 NOTE — ASSESSMENT & PLAN NOTE
Pt has been on xarelto for the past 3 months. His swelling has resolved. This was his fisrt DVT.     Will discontinue xarelto.

## 2022-01-17 NOTE — PROGRESS NOTES
"  CC: Follow up for DVT and back pain                                                                                                                                 HPI:   Dionte presents today with the following.    Problem   Acute Bilateral Low Back Pain With Left-Sided Sciatica   History of Dvt of Lower Extremity       Current Outpatient Medications   Medication Sig Dispense Refill   • cyclobenzaprine (FLEXERIL) 10 mg Tab Take 1 Tablet by mouth 3 times a day as needed for Moderate Pain or Muscle Spasms. 90 Tablet 1   • Multiple Vitamins-Minerals (CENTRUM SILVER PO) Take  by mouth.       No current facility-administered medications for this visit.       Allergies as of 01/17/2022   • (No Known Allergies)        ROS:  All systems negative expect as addressed in assessment and plan.     /66 (BP Location: Right arm, Patient Position: Sitting, BP Cuff Size: Adult)   Pulse 95   Temp 36.8 °C (98.2 °F) (Temporal)   Ht 1.803 m (5' 11\")   Wt 77 kg (169 lb 12.8 oz)   SpO2 94%   BMI 23.68 kg/m²     Physical Exam:  Gen:         Alert and oriented, No apparent distress.  Neck:        No Lymphadenopathy.   Lungs:     Clear to auscultation bilaterally. No wheezes, rales, or rhonchi.   CV:          Regular rate and rhythm. No murmurs, rubs or gallops.         Ext:          No clubbing, cyanosis, or peripheral edema.  Skin:  All visible skin intact without lesions.       Assessment and Plan:  61 y.o. male with the following issues.    1. History of DVT of lower extremity     2. Acute bilateral low back pain with left-sided sciatica  cyclobenzaprine (FLEXERIL) 10 mg Tab    DISCONTINUED: cyclobenzaprine (FLEXERIL) 10 mg Tab   3. Need for vaccination  Shingles Vaccine (Shingrix)        History of DVT of lower extremity  Pt has been on xarelto for the past 3 months. His swelling has resolved. This was his fisrt DVT.     Will discontinue xarelto.       Acute bilateral low back pain with left-sided sciatica  Patient reports " that this has improved. He still uses the flexeril as needed. He also uses tylenol as needed.     Continue as needed medications.       Return for as needed or yearly.    I have placed the below orders and discussed them with an approved delegating provider.  The MA is performing the below orders under the direction of Dr. Quiroga.    Please note that this dictation was created using voice recognition software. I have worked with consultants from the vendor as well as technical experts from [employer] [facility] to optimize the interface. I have made every reasonable attempt to correct obvious errors, but I expect that there are errors of grammar and possibly content that I did not discover before finalizing the note.

## 2022-04-25 ENCOUNTER — OFFICE VISIT (OUTPATIENT)
Dept: URGENT CARE | Facility: PHYSICIAN GROUP | Age: 62
End: 2022-04-25
Payer: COMMERCIAL

## 2022-04-25 VITALS
HEART RATE: 93 BPM | BODY MASS INDEX: 23.66 KG/M2 | TEMPERATURE: 98.2 F | SYSTOLIC BLOOD PRESSURE: 100 MMHG | DIASTOLIC BLOOD PRESSURE: 68 MMHG | WEIGHT: 169 LBS | OXYGEN SATURATION: 93 % | HEIGHT: 71 IN | RESPIRATION RATE: 16 BRPM

## 2022-04-25 DIAGNOSIS — H60.509 ACUTE OTITIS EXTERNA, UNSPECIFIED LATERALITY, UNSPECIFIED TYPE: ICD-10-CM

## 2022-04-25 PROCEDURE — 99213 OFFICE O/P EST LOW 20 MIN: CPT | Performed by: PHYSICIAN ASSISTANT

## 2022-04-25 RX ORDER — NEOMYCIN SULFATE, POLYMYXIN B SULFATE AND HYDROCORTISONE 10; 3.5; 1 MG/ML; MG/ML; [USP'U]/ML
4 SUSPENSION/ DROPS AURICULAR (OTIC) 4 TIMES DAILY
Qty: 16 ML | Refills: 0 | Status: SHIPPED | OUTPATIENT
Start: 2022-04-25 | End: 2022-05-05

## 2022-04-25 ASSESSMENT — ENCOUNTER SYMPTOMS
SORE THROAT: 0
EYE PAIN: 0
SHORTNESS OF BREATH: 0
MYALGIAS: 0
FEVER: 0
CHILLS: 0
HEADACHES: 0
NAUSEA: 0
DIARRHEA: 0
COUGH: 0
VOMITING: 0
CONSTIPATION: 0
ABDOMINAL PAIN: 0

## 2022-04-25 ASSESSMENT — FIBROSIS 4 INDEX: FIB4 SCORE: 1.51

## 2022-04-25 NOTE — LETTER
April 25, 2022    To Whom It May Concern:         This is confirmation that Dionte Rodriges attended his scheduled appointment with Yousuf Mathews P.A.-C. on 4/25/22.  I recommend this gentleman take today off of work to rest and recuperate. If he is feeling improved he may return to work tomorrow.         If you have any questions please do not hesitate to call me at the phone number listed below.    Sincerely,  Yousuf Mathews P.A.-C.  [phone]  (signed electronically)

## 2022-04-25 NOTE — PROGRESS NOTES
"Subjective:   Dionte Rodriges is a 61 y.o. male who presents for Ear Pain (X3 days, both ears, pain has gone down to neck and legs )      61-year-old male presents complaining of bilateral ear pain, both ears seem to be significantly irritated.  He is noted symptom onset over the last 3 days.  He has had similar symptoms in the past that seemed more like a inflammation from changes in air pressure and temperature.  Has been trying some homeopathic drops which seem to help out temporarily.  He has not noticed any tinnitus or vertigo.  He has decreased hearing at baseline, has not noticed a change.  He seems to get sharp pains and feels the entire sensation shoot down his body.  Denies any chest pain or difficulty breathing.  Has not had a headache or any neck pain or stiffness.      Review of Systems   Constitutional: Negative for chills and fever.   HENT: Positive for ear pain. Negative for congestion and sore throat.    Eyes: Negative for pain.   Respiratory: Negative for cough and shortness of breath.    Cardiovascular: Negative for chest pain.   Gastrointestinal: Negative for abdominal pain, constipation, diarrhea, nausea and vomiting.   Genitourinary: Negative for dysuria.   Musculoskeletal: Negative for myalgias.   Skin: Negative for rash.   Neurological: Negative for headaches.       Medications, Allergies, and current problem list reviewed today in Epic.     Objective:     /68 (BP Location: Right arm, Patient Position: Sitting, BP Cuff Size: Adult)   Pulse 93   Temp 36.8 °C (98.2 °F) (Temporal)   Resp 16   Ht 1.803 m (5' 11\")   Wt 76.7 kg (169 lb)   SpO2 93%     Physical Exam  Vitals reviewed.   Constitutional:       Appearance: Normal appearance.   HENT:      Head: Normocephalic and atraumatic.      Right Ear: External ear normal.      Left Ear: External ear normal.      Ears:      Comments: Injected and erythematous as well as edematous canals bilaterally.  Pearly gray left tympanic membrane, " right tympanic membrane is mildly injected, nonbulging without significant erythema or loss of landmarks.  No mastoid tenderness.  External ear normal     Nose: Nose normal.      Mouth/Throat:      Mouth: Mucous membranes are moist.   Eyes:      Conjunctiva/sclera: Conjunctivae normal.   Cardiovascular:      Rate and Rhythm: Normal rate.   Pulmonary:      Effort: Pulmonary effort is normal.   Skin:     General: Skin is warm and dry.      Capillary Refill: Capillary refill takes less than 2 seconds.   Neurological:      Mental Status: He is alert and oriented to person, place, and time.         Assessment/Plan:     Diagnosis and associated orders:     1. Acute otitis externa, unspecified laterality, unspecified type  neomycin-polymyxin-HC (PEDIOTIC HC) 3.5-31523-4 Suspension      Comments/MDM:     • Consider addition of antihistamine, recommend Xyzal or Allegra given his profession as a .  Pediotic drops sent to the pharmacy.  If not demonstrating significant improvement consider repeat evaluation.  No indication for oral antibiotics currently, no signs of deep space infection.  Somewhat puzzling why he is getting this radiation of his pain but on exam this does seem fairly clear to be otitis externa         Differential diagnosis, natural history, supportive care, and indications for immediate follow-up discussed.    Advised the patient to follow-up with the primary care physician for recheck, reevaluation, and consideration of further management.    Please note that this dictation was created using voice recognition software. I have made a reasonable attempt to correct obvious errors, but I expect that there are errors of grammar and possibly content that I did not discover before finalizing the note.    This note was electronically signed by Yuosuf Mathews PA-C

## 2022-04-28 ENCOUNTER — OFFICE VISIT (OUTPATIENT)
Dept: MEDICAL GROUP | Facility: PHYSICIAN GROUP | Age: 62
End: 2022-04-28
Payer: COMMERCIAL

## 2022-04-28 VITALS
SYSTOLIC BLOOD PRESSURE: 114 MMHG | BODY MASS INDEX: 23.88 KG/M2 | HEIGHT: 71 IN | DIASTOLIC BLOOD PRESSURE: 62 MMHG | WEIGHT: 170.6 LBS | TEMPERATURE: 98 F | OXYGEN SATURATION: 95 % | HEART RATE: 87 BPM

## 2022-04-28 DIAGNOSIS — H60.393 CHRONIC BACTERIAL OTITIS EXTERNA OF BOTH EARS: ICD-10-CM

## 2022-04-28 PROBLEM — H60.399: Status: ACTIVE | Noted: 2022-04-28

## 2022-04-28 PROCEDURE — 99213 OFFICE O/P EST LOW 20 MIN: CPT | Performed by: NURSE PRACTITIONER

## 2022-04-28 RX ORDER — PSEUDOEPHEDRINE HCL 120 MG/1
120 TABLET, FILM COATED, EXTENDED RELEASE ORAL 2 TIMES DAILY PRN
Qty: 10 TABLET | Refills: 0 | Status: SHIPPED | OUTPATIENT
Start: 2022-04-28 | End: 2022-05-03

## 2022-04-28 ASSESSMENT — FIBROSIS 4 INDEX: FIB4 SCORE: 1.51

## 2022-04-28 NOTE — PROGRESS NOTES
"  CC: follow up for ear infection                                                                                                                                    HPI:    Dionte presents today with the following.    Problem   Bacterial External Ear Infection       Current Outpatient Medications   Medication Sig Dispense Refill   • pseudoephedrine SR (SUDAFED SR) 120 MG TABLET SR 12 HR Take 1 Tablet by mouth 2 times a day as needed for Congestion for up to 5 days. 10 Tablet 0   • neomycin-polymyxin-HC (PEDIOTIC HC) 3.5-19492-1 Suspension Administer 4 Drops into affected ear(s) 4 times a day for 10 days. (Patient taking differently: Administer 4 Drops into affected ear(s) 4 times a day. Pt only taking bid) 16 mL 0   • Multiple Vitamins-Minerals (CENTRUM SILVER PO) Take  by mouth.     • cyclobenzaprine (FLEXERIL) 10 mg Tab Take 1 Tablet by mouth 3 times a day as needed for Moderate Pain or Muscle Spasms. 90 Tablet 1     No current facility-administered medications for this visit.       Allergies as of 04/28/2022   • (No Known Allergies)        ROS:  All systems negative expect as addressed in assessment and plan.     /62 (BP Location: Left arm, Patient Position: Sitting, BP Cuff Size: Adult)   Pulse 87   Temp 36.7 °C (98 °F)   Ht 1.803 m (5' 11\")   Wt 77.4 kg (170 lb 9.6 oz)   SpO2 95%   BMI 23.79 kg/m²     Physical Exam:  Gen:         Alert and oriented, No apparent distress.  Neck:        No Lymphadenopathy.   Lungs:     Clear to auscultation bilaterally. No wheezes, rales, or rhonchi.   CV:          Regular rate and rhythm. No murmurs, rubs or gallops.         Ext:          No clubbing, cyanosis, or peripheral edema.  Skin:  All visible skin intact without lesions.        Assessment and Plan:  61 y.o. male with the following issues.    1. Chronic bacterial otitis externa of both ears          Bacterial external ear infection  Patient was seen for an ear infection 2 days ago. He was prescribed ear " drops, which are slowly helping.     His ear canals are slightly red but not swollen. No discharge. Pts TMs bilateral are grey and pearly. There is copious amounts of mucus.     Patient to continue ear drops. Will also provide with decongestant.     Return if symptoms worsen or fail to improve.    I have placed the below orders and discussed them with an approved delegating provider.  The MA is performing the below orders under the direction of Dr. jones.    Please note that this dictation was created using voice recognition software. I have worked with consultants from the vendor as well as technical experts from [employer] to optimize the interface. I have made every reasonable attempt to correct obvious errors, but I expect that there are errors of grammar and possibly content that I did not discover before finalizing the note.

## 2022-04-28 NOTE — ASSESSMENT & PLAN NOTE
Patient was seen for an ear infection 2 days ago. He was prescribed ear drops, which are slowly helping.     His ear canals are slightly red but not swollen. No discharge. Pts TMs bilateral are grey and pearly. There is copious amounts of mucus.     Patient to continue ear drops. Will also provide with decongestant.

## 2022-12-12 ENCOUNTER — OFFICE VISIT (OUTPATIENT)
Dept: MEDICAL GROUP | Facility: PHYSICIAN GROUP | Age: 62
End: 2022-12-12
Payer: COMMERCIAL

## 2022-12-12 VITALS
TEMPERATURE: 98.4 F | OXYGEN SATURATION: 96 % | RESPIRATION RATE: 20 BRPM | SYSTOLIC BLOOD PRESSURE: 112 MMHG | HEART RATE: 81 BPM | WEIGHT: 175 LBS | HEIGHT: 71 IN | DIASTOLIC BLOOD PRESSURE: 70 MMHG | BODY MASS INDEX: 24.5 KG/M2

## 2022-12-12 DIAGNOSIS — R53.83 OTHER FATIGUE: ICD-10-CM

## 2022-12-12 DIAGNOSIS — Z12.5 ENCOUNTER FOR SCREENING FOR MALIGNANT NEOPLASM OF PROSTATE: ICD-10-CM

## 2022-12-12 DIAGNOSIS — E55.9 VITAMIN D DEFICIENCY: ICD-10-CM

## 2022-12-12 DIAGNOSIS — F17.210 SMOKING GREATER THAN 40 PACK YEARS: ICD-10-CM

## 2022-12-12 DIAGNOSIS — Z00.00 ENCOUNTER FOR HEALTH MAINTENANCE EXAMINATION IN ADULT: ICD-10-CM

## 2022-12-12 DIAGNOSIS — Z77.090 ASBESTOS EXPOSURE: ICD-10-CM

## 2022-12-12 DIAGNOSIS — J43.1 PANLOBULAR EMPHYSEMA (HCC): ICD-10-CM

## 2022-12-12 DIAGNOSIS — E78.49 OTHER HYPERLIPIDEMIA: ICD-10-CM

## 2022-12-12 DIAGNOSIS — Z12.2 SCREENING FOR LUNG CANCER: ICD-10-CM

## 2022-12-12 DIAGNOSIS — Z13.29 SCREENING FOR THYROID DISORDER: ICD-10-CM

## 2022-12-12 PROBLEM — H60.399: Status: RESOLVED | Noted: 2022-04-28 | Resolved: 2022-12-12

## 2022-12-12 PROCEDURE — 99214 OFFICE O/P EST MOD 30 MIN: CPT | Performed by: NURSE PRACTITIONER

## 2022-12-12 RX ORDER — FLUTICASONE FUROATE AND VILANTEROL 200; 25 UG/1; UG/1
1 POWDER RESPIRATORY (INHALATION) DAILY
Qty: 3 EACH | Refills: 3 | Status: SHIPPED | OUTPATIENT
Start: 2022-12-12 | End: 2023-04-24

## 2022-12-12 RX ORDER — ASPIRIN 81 MG/1
81 TABLET, CHEWABLE ORAL DAILY
COMMUNITY

## 2022-12-12 ASSESSMENT — FIBROSIS 4 INDEX: FIB4 SCORE: 1.53

## 2022-12-12 NOTE — ASSESSMENT & PLAN NOTE
This is a chronic condition. Patient was found to have emphysema on his CT scan. He does have a history of asbestos exposure for 9 years. He is also a smoker.     He is not on any inhalers as he denies any SOB or wheezing.     Recommended to try a daily inhaler to see if this will help his fatigue.     Will start Breo Ellipta one puff daily.

## 2022-12-12 NOTE — PROGRESS NOTES
"  Chief Complaint   Patient presents with    Annual Exam                                                                                                                                       HPI:   Dionte presents today with the following.    Problem   Other Fatigue   Asbestos Exposure   Panlobular emphysema (HCC)       Current Outpatient Medications   Medication Sig Dispense Refill    aspirin (ASA) 81 MG Chew Tab chewable tablet Chew 81 mg every day.      fluticasone furoate-vilanterol (BREO ELLIPTA) 200-25 MCG/ACT AEROSOL POWDER, BREATH ACTIVATED Inhale 1 Puff every day. 3 Each 3    Multiple Vitamins-Minerals (CENTRUM SILVER PO) Take  by mouth.       No current facility-administered medications for this visit.       Allergies as of 12/12/2022    (No Known Allergies)      ROS:  All systems negative expect as addressed in assessment and plan.     /70 (BP Location: Right arm, Patient Position: Sitting, BP Cuff Size: Adult)   Pulse 81   Temp 36.9 °C (98.4 °F) (Temporal)   Resp 20   Ht 1.803 m (5' 11\")   Wt 79.4 kg (175 lb)   SpO2 96%   BMI 24.41 kg/m²       Physical Exam  Vitals reviewed.   Constitutional:       Appearance: Normal appearance.   HENT:      Head: Normocephalic and atraumatic.      Mouth/Throat:      Mouth: Mucous membranes are moist.   Eyes:      Extraocular Movements: Extraocular movements intact.      Conjunctiva/sclera: Conjunctivae normal.   Cardiovascular:      Rate and Rhythm: Normal rate and regular rhythm.   Pulmonary:      Effort: Pulmonary effort is normal.      Breath sounds: Decreased air movement present. Examination of the right-lower field reveals decreased breath sounds. Examination of the left-lower field reveals decreased breath sounds. Decreased breath sounds present.   Musculoskeletal:         General: Normal range of motion.      Cervical back: Normal range of motion.   Skin:     General: Skin is warm and dry.   Neurological:      General: No focal deficit present.      " Mental Status: He is alert and oriented to person, place, and time.   Psychiatric:         Mood and Affect: Mood normal.         Behavior: Behavior normal.         Thought Content: Thought content normal.         Assessment and Plan:  62 y.o. male with the following issues.    1. Other fatigue  TSH    FREE THYROXINE    VITAMIN B12    Comp Metabolic Panel      2. Vitamin D deficiency  VITAMIN D,25 HYDROXY (DEFICIENCY)      3. Encounter for health maintenance examination in adult  VITAMIN D,25 HYDROXY (DEFICIENCY)    TSH    FREE THYROXINE    VITAMIN B12    CBC WITHOUT DIFFERENTIAL    Comp Metabolic Panel    Lipid Profile    PROSTATE SPECIFIC AG SCREENING      4. Encounter for screening for malignant neoplasm of prostate  PROSTATE SPECIFIC AG SCREENING      5. Other hyperlipidemia  Lipid Profile      6. Screening for thyroid disorder  TSH    FREE THYROXINE      7. Panlobular emphysema (HCC)        8. Asbestos exposure  CT-CHEST LOW DOSE W/O      9. Screening for lung cancer  CT-CHEST LOW DOSE W/O      10. Smoking greater than 40 pack years  CT-CHEST LOW DOSE W/O           Other fatigue  This is a chronic condition. This is a chronic condition. Patient reports worsening fatigue over the last several weeks. He states that he has been sleeping well and despite getting a good amount of sleep he has still been tired. 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     Discussed possible differentials of anemia vs sleep apnea vs shift work sleep disorder.     Will obtain labs to rule out anemia and thyroid issues.         Panlobular emphysema (HCC)  This is a chronic condition. Patient was found to have emphysema on his CT scan. He does have a history of asbestos exposure for 9 years. He is also a smoker.     He is not on any inhalers as he denies any SOB or wheezing.     Recommended to try a daily inhaler to see if this will help his fatigue.     Will start Breo Ellipta one puff daily.         Asbestos  exposure  This is a chronic condition. Patient gets yearly CT scans to screen for cancer due to asbestos exposure. 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     Will order yearly screening CT.       Return in about 3 months (around 3/12/2023) for follow up for labs and emphasema.      I have placed the below orders and discussed them with an approved delegating provider.  The MA is performing the below orders under the direction of Dr. Adhikari.    Please note that this dictation was created using voice recognition software. I have worked with consultants from the vendor as well as technical experts from [facility] to optimize the interface. I have made every reasonable attempt to correct obvious errors, but I expect that there are errors of grammar and possibly content that I did not discover before finalizing the note.

## 2022-12-12 NOTE — ASSESSMENT & PLAN NOTE
This is a chronic condition. This is a chronic condition. Patient reports worsening fatigue over the last several weeks. He states that he has been sleeping well and despite getting a good amount of sleep he has still been tired. 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     Discussed possible differentials of anemia vs sleep apnea vs shift work sleep disorder.     Will obtain labs to rule out anemia and thyroid issues.

## 2022-12-20 ENCOUNTER — TELEPHONE (OUTPATIENT)
Dept: MEDICAL GROUP | Facility: PHYSICIAN GROUP | Age: 62
End: 2022-12-20
Payer: COMMERCIAL

## 2022-12-20 NOTE — ASSESSMENT & PLAN NOTE
This is a chronic condition. Patient gets yearly CT scans to screen for cancer due to asbestos exposure. 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     Will order yearly screening CT.

## 2022-12-27 ENCOUNTER — HOSPITAL ENCOUNTER (OUTPATIENT)
Dept: LAB | Facility: MEDICAL CENTER | Age: 62
End: 2022-12-27
Attending: NURSE PRACTITIONER
Payer: COMMERCIAL

## 2022-12-27 DIAGNOSIS — E55.9 VITAMIN D DEFICIENCY: ICD-10-CM

## 2022-12-27 DIAGNOSIS — Z12.5 ENCOUNTER FOR SCREENING FOR MALIGNANT NEOPLASM OF PROSTATE: ICD-10-CM

## 2022-12-27 DIAGNOSIS — Z00.00 ENCOUNTER FOR HEALTH MAINTENANCE EXAMINATION IN ADULT: ICD-10-CM

## 2022-12-27 DIAGNOSIS — Z13.29 SCREENING FOR THYROID DISORDER: ICD-10-CM

## 2022-12-27 DIAGNOSIS — E78.49 OTHER HYPERLIPIDEMIA: ICD-10-CM

## 2022-12-27 DIAGNOSIS — R53.83 OTHER FATIGUE: ICD-10-CM

## 2022-12-27 LAB
25(OH)D3 SERPL-MCNC: 28 NG/ML (ref 30–100)
ALBUMIN SERPL BCP-MCNC: 4.3 G/DL (ref 3.2–4.9)
ALBUMIN/GLOB SERPL: 1.5 G/DL
ALP SERPL-CCNC: 116 U/L (ref 30–99)
ALT SERPL-CCNC: 25 U/L (ref 2–50)
ANION GAP SERPL CALC-SCNC: 13 MMOL/L (ref 7–16)
AST SERPL-CCNC: 23 U/L (ref 12–45)
BILIRUB SERPL-MCNC: 0.5 MG/DL (ref 0.1–1.5)
BUN SERPL-MCNC: 9 MG/DL (ref 8–22)
CALCIUM ALBUM COR SERPL-MCNC: 10.4 MG/DL (ref 8.5–10.5)
CALCIUM SERPL-MCNC: 10.6 MG/DL (ref 8.5–10.5)
CHLORIDE SERPL-SCNC: 102 MMOL/L (ref 96–112)
CHOLEST SERPL-MCNC: 205 MG/DL (ref 100–199)
CO2 SERPL-SCNC: 25 MMOL/L (ref 20–33)
CREAT SERPL-MCNC: 1.16 MG/DL (ref 0.5–1.4)
ERYTHROCYTE [DISTWIDTH] IN BLOOD BY AUTOMATED COUNT: 41.1 FL (ref 35.9–50)
FASTING STATUS PATIENT QL REPORTED: NORMAL
GFR SERPLBLD CREATININE-BSD FMLA CKD-EPI: 71 ML/MIN/1.73 M 2
GLOBULIN SER CALC-MCNC: 2.8 G/DL (ref 1.9–3.5)
GLUCOSE SERPL-MCNC: 103 MG/DL (ref 65–99)
HCT VFR BLD AUTO: 56.8 % (ref 42–52)
HDLC SERPL-MCNC: 33 MG/DL
HGB BLD-MCNC: 19.4 G/DL (ref 14–18)
LDLC SERPL CALC-MCNC: 107 MG/DL
MCH RBC QN AUTO: 31.3 PG (ref 27–33)
MCHC RBC AUTO-ENTMCNC: 34.2 G/DL (ref 33.7–35.3)
MCV RBC AUTO: 91.8 FL (ref 81.4–97.8)
PLATELET # BLD AUTO: 216 K/UL (ref 164–446)
PMV BLD AUTO: 10.8 FL (ref 9–12.9)
POTASSIUM SERPL-SCNC: 4.5 MMOL/L (ref 3.6–5.5)
PROT SERPL-MCNC: 7.1 G/DL (ref 6–8.2)
PSA SERPL-MCNC: 2.23 NG/ML (ref 0–4)
RBC # BLD AUTO: 6.19 M/UL (ref 4.7–6.1)
SODIUM SERPL-SCNC: 140 MMOL/L (ref 135–145)
T4 FREE SERPL-MCNC: 1.16 NG/DL (ref 0.93–1.7)
TRIGL SERPL-MCNC: 323 MG/DL (ref 0–149)
TSH SERPL DL<=0.005 MIU/L-ACNC: 2.96 UIU/ML (ref 0.38–5.33)
VIT B12 SERPL-MCNC: 1293 PG/ML (ref 211–911)
WBC # BLD AUTO: 12.3 K/UL (ref 4.8–10.8)

## 2022-12-27 PROCEDURE — 82306 VITAMIN D 25 HYDROXY: CPT

## 2022-12-27 PROCEDURE — 36415 COLL VENOUS BLD VENIPUNCTURE: CPT

## 2022-12-27 PROCEDURE — 84439 ASSAY OF FREE THYROXINE: CPT

## 2022-12-27 PROCEDURE — 80053 COMPREHEN METABOLIC PANEL: CPT

## 2022-12-27 PROCEDURE — 82607 VITAMIN B-12: CPT

## 2022-12-27 PROCEDURE — 84443 ASSAY THYROID STIM HORMONE: CPT

## 2022-12-27 PROCEDURE — 84153 ASSAY OF PSA TOTAL: CPT

## 2022-12-27 PROCEDURE — 80061 LIPID PANEL: CPT

## 2022-12-27 PROCEDURE — 85027 COMPLETE CBC AUTOMATED: CPT

## 2023-02-06 ENCOUNTER — OFFICE VISIT (OUTPATIENT)
Dept: MEDICAL GROUP | Facility: PHYSICIAN GROUP | Age: 63
End: 2023-02-06
Payer: COMMERCIAL

## 2023-02-06 VITALS
DIASTOLIC BLOOD PRESSURE: 66 MMHG | HEART RATE: 82 BPM | RESPIRATION RATE: 14 BRPM | OXYGEN SATURATION: 93 % | BODY MASS INDEX: 23.94 KG/M2 | TEMPERATURE: 98.7 F | HEIGHT: 71 IN | SYSTOLIC BLOOD PRESSURE: 110 MMHG | WEIGHT: 171 LBS

## 2023-02-06 DIAGNOSIS — E78.5 DYSLIPIDEMIA: ICD-10-CM

## 2023-02-06 DIAGNOSIS — R53.83 OTHER FATIGUE: ICD-10-CM

## 2023-02-06 DIAGNOSIS — Z86.718 HISTORY OF DVT OF LOWER EXTREMITY: ICD-10-CM

## 2023-02-06 PROCEDURE — 99214 OFFICE O/P EST MOD 30 MIN: CPT | Performed by: NURSE PRACTITIONER

## 2023-02-06 RX ORDER — ATORVASTATIN CALCIUM 10 MG/1
10 TABLET, FILM COATED ORAL NIGHTLY
Qty: 90 TABLET | Refills: 1 | Status: SHIPPED | OUTPATIENT
Start: 2023-02-06 | End: 2023-08-04

## 2023-02-06 ASSESSMENT — FIBROSIS 4 INDEX: FIB4 SCORE: 1.32

## 2023-02-06 NOTE — PROGRESS NOTES
"  Chief Complaint   Patient presents with    Follow-Up     Lab results                                                                                                                                        HPI:   Dionte presents today with the following.    Problem   Dyslipidemia   Other Fatigue   History of Dvt of Lower Extremity       Current Outpatient Medications   Medication Sig Dispense Refill    atorvastatin (LIPITOR) 10 MG Tab Take 1 Tablet by mouth every evening. 90 Tablet 1    aspirin (ASA) 81 MG Chew Tab chewable tablet Chew 81 mg every day.      fluticasone furoate-vilanterol (BREO ELLIPTA) 200-25 MCG/ACT AEROSOL POWDER, BREATH ACTIVATED Inhale 1 Puff every day. 3 Each 3    Multiple Vitamins-Minerals (CENTRUM SILVER PO) Take  by mouth.       No current facility-administered medications for this visit.       Allergies as of 02/06/2023    (No Known Allergies)        ROS:  All systems negative expect as addressed in assessment and plan.     /66 (BP Location: Left arm, Patient Position: Sitting, BP Cuff Size: Adult)   Pulse 82   Temp 37.1 °C (98.7 °F) (Temporal)   Resp 14   Ht 1.803 m (5' 11\")   Wt 77.6 kg (171 lb)   SpO2 93%   BMI 23.85 kg/m²       Physical Exam  Vitals reviewed.   Constitutional:       Appearance: Normal appearance.   HENT:      Head: Normocephalic and atraumatic.      Mouth/Throat:      Mouth: Mucous membranes are moist.   Eyes:      Extraocular Movements: Extraocular movements intact.      Conjunctiva/sclera: Conjunctivae normal.   Cardiovascular:      Rate and Rhythm: Normal rate and regular rhythm.      Heart sounds: Normal heart sounds.   Pulmonary:      Effort: Pulmonary effort is normal.      Breath sounds: Normal breath sounds.   Musculoskeletal:         General: Normal range of motion.      Cervical back: Normal range of motion.   Skin:     General: Skin is warm and dry.   Neurological:      General: No focal deficit present.      Mental Status: He is alert and " oriented to person, place, and time.   Psychiatric:         Mood and Affect: Mood normal.         Behavior: Behavior normal.         Thought Content: Thought content normal.       Assessment and Plan:  62 y.o. male with the following issues.    1. Dyslipidemia  Lipid Profile      2. History of DVT of lower extremity        3. Other fatigue             Dyslipidemia  This is a chronic condition. Patients most recent lipid panel shows increasing LDL and triglycerides. Patients ASCVD score is 15.7%.     Will start atorvastatin 10mg.     History of DVT of lower extremity  Patient takes a daily aspirin to help decrease his risk of a recurrent DVT as he is a .       Other fatigue  This is chronic condition.  Patient reports continuing fatigue.  This may be due to working night shift and shift work for the last 12 to 15 years.    Also discussed possible differentials of anemia versus sleep apnea versus shiftwork sleep disorder.    Discussed with patient that if this does not improve in the neck several months we will consider additional work-up.      Return in about 3 months (around 5/6/2023) for follow up for cholesterol.      I have placed the below orders and discussed them with an approved delegating provider.  The MA is performing the below orders under the direction of Dr. Adhikari.    Please note that this dictation was created using voice recognition software. I have worked with consultants from the vendor as well as technical experts from [facility] [employer] to optimize the interface. I have made every reasonable attempt to correct obvious errors, but I expect that there are errors of grammar and possibly content that I did not discover before finalizing the note.

## 2023-02-14 NOTE — ASSESSMENT & PLAN NOTE
This is chronic condition.  Patient reports continuing fatigue.  This may be due to working night shift and shift work for the last 12 to 15 years.    Also discussed possible differentials of anemia versus sleep apnea versus shiftwork sleep disorder.    Discussed with patient that if this does not improve in the neck several months we will consider additional work-up.

## 2023-02-17 ENCOUNTER — HOSPITAL ENCOUNTER (OUTPATIENT)
Dept: LAB | Facility: MEDICAL CENTER | Age: 63
End: 2023-02-17
Attending: NURSE PRACTITIONER
Payer: COMMERCIAL

## 2023-02-17 LAB
CHOLEST SERPL-MCNC: 144 MG/DL (ref 100–199)
FASTING STATUS PATIENT QL REPORTED: NORMAL
HDLC SERPL-MCNC: 36 MG/DL
LDLC SERPL CALC-MCNC: 78 MG/DL
TRIGL SERPL-MCNC: 152 MG/DL (ref 0–149)

## 2023-02-17 PROCEDURE — 80061 LIPID PANEL: CPT

## 2023-02-17 PROCEDURE — 36415 COLL VENOUS BLD VENIPUNCTURE: CPT

## 2023-04-21 ENCOUNTER — PATIENT MESSAGE (OUTPATIENT)
Dept: MEDICAL GROUP | Facility: PHYSICIAN GROUP | Age: 63
End: 2023-04-21
Payer: COMMERCIAL

## 2023-10-23 ENCOUNTER — OFFICE VISIT (OUTPATIENT)
Dept: MEDICAL GROUP | Facility: PHYSICIAN GROUP | Age: 63
End: 2023-10-23
Payer: COMMERCIAL

## 2023-10-23 ENCOUNTER — APPOINTMENT (OUTPATIENT)
Dept: RADIOLOGY | Facility: IMAGING CENTER | Age: 63
End: 2023-10-23
Attending: NURSE PRACTITIONER
Payer: COMMERCIAL

## 2023-10-23 VITALS
BODY MASS INDEX: 23.66 KG/M2 | RESPIRATION RATE: 16 BRPM | OXYGEN SATURATION: 96 % | SYSTOLIC BLOOD PRESSURE: 118 MMHG | HEIGHT: 71 IN | DIASTOLIC BLOOD PRESSURE: 70 MMHG | HEART RATE: 88 BPM | TEMPERATURE: 98.1 F | WEIGHT: 169 LBS

## 2023-10-23 DIAGNOSIS — Z13.220 ENCOUNTER FOR SCREENING FOR LIPID DISORDER: ICD-10-CM

## 2023-10-23 DIAGNOSIS — G89.29 CHRONIC BILATERAL LOW BACK PAIN WITH LEFT-SIDED SCIATICA: ICD-10-CM

## 2023-10-23 DIAGNOSIS — J43.1 PANLOBULAR EMPHYSEMA (HCC): ICD-10-CM

## 2023-10-23 DIAGNOSIS — E55.9 VITAMIN D DEFICIENCY: ICD-10-CM

## 2023-10-23 DIAGNOSIS — M54.42 CHRONIC BILATERAL LOW BACK PAIN WITH LEFT-SIDED SCIATICA: ICD-10-CM

## 2023-10-23 DIAGNOSIS — Z83.3 FAMILY HISTORY OF DIABETES MELLITUS: ICD-10-CM

## 2023-10-23 DIAGNOSIS — R73.01 ELEVATED FASTING GLUCOSE: ICD-10-CM

## 2023-10-23 DIAGNOSIS — Z13.1 ENCOUNTER FOR SCREENING FOR DIABETES MELLITUS: ICD-10-CM

## 2023-10-23 DIAGNOSIS — Z23 NEED FOR VACCINATION: ICD-10-CM

## 2023-10-23 DIAGNOSIS — Z13.29 SCREENING FOR THYROID DISORDER: ICD-10-CM

## 2023-10-23 DIAGNOSIS — Z77.090 ASBESTOS EXPOSURE: ICD-10-CM

## 2023-10-23 PROCEDURE — 3074F SYST BP LT 130 MM HG: CPT | Performed by: NURSE PRACTITIONER

## 2023-10-23 PROCEDURE — 72110 X-RAY EXAM L-2 SPINE 4/>VWS: CPT | Mod: TC

## 2023-10-23 PROCEDURE — 99214 OFFICE O/P EST MOD 30 MIN: CPT | Mod: 25 | Performed by: NURSE PRACTITIONER

## 2023-10-23 PROCEDURE — 3078F DIAST BP <80 MM HG: CPT | Performed by: NURSE PRACTITIONER

## 2023-10-23 PROCEDURE — 90686 IIV4 VACC NO PRSV 0.5 ML IM: CPT | Performed by: NURSE PRACTITIONER

## 2023-10-23 PROCEDURE — 90471 IMMUNIZATION ADMIN: CPT | Performed by: NURSE PRACTITIONER

## 2023-10-23 RX ORDER — ATORVASTATIN CALCIUM 10 MG/1
10 TABLET, FILM COATED ORAL NIGHTLY
Qty: 90 TABLET | Refills: 3 | Status: SHIPPED | OUTPATIENT
Start: 2023-10-23

## 2023-10-23 RX ORDER — METHOCARBAMOL 750 MG/1
750 TABLET, FILM COATED ORAL 3 TIMES DAILY PRN
Qty: 90 TABLET | Refills: 3 | Status: SHIPPED | OUTPATIENT
Start: 2023-10-23

## 2023-10-23 ASSESSMENT — PATIENT HEALTH QUESTIONNAIRE - PHQ9: CLINICAL INTERPRETATION OF PHQ2 SCORE: 0

## 2023-10-23 ASSESSMENT — FIBROSIS 4 INDEX: FIB4 SCORE: 1.32

## 2023-10-23 NOTE — PROGRESS NOTES
"  Chief Complaint   Patient presents with    Back Pain     Lower back pain x 4 month    Leg Cramps     Bilateral calf                                                                                                                                       HPI:   Dionte presents today with the following.    Problem   Chronic Bilateral Low Back Pain With Left-Sided Sciatica   Panlobular emphysema (HCC)       Current Outpatient Medications   Medication Sig Dispense Refill    methocarbamol (ROBAXIN) 750 MG Tab Take 1 Tablet by mouth 3 times a day as needed (back spasms). 90 Tablet 3    atorvastatin (LIPITOR) 10 MG Tab Take 1 Tablet by mouth every evening. 90 Tablet 3    fluticasone-umeclidin-vilant (TRELEGY) 100-62.5-25 MCG/ACT AEROSOL POWDER, BREATH ACTIVATED inhalation Inhale 1 Inhalation. every day. 3 Each 3    aspirin (ASA) 81 MG Chew Tab chewable tablet Chew 81 mg every day.      Multiple Vitamins-Minerals (CENTRUM SILVER PO) Take  by mouth.       No current facility-administered medications for this visit.       Allergies as of 10/23/2023    (No Known Allergies)        ROS:  All systems negative expect as addressed in assessment and plan.     /70 (BP Location: Left arm)   Pulse 88   Temp 36.7 °C (98.1 °F) (Temporal)   Resp 16   Ht 1.803 m (5' 11\")   Wt 76.7 kg (169 lb)   SpO2 96%   BMI 23.57 kg/m²       Physical Exam  Vitals reviewed.   Constitutional:       Appearance: Normal appearance.   HENT:      Head: Normocephalic and atraumatic.      Mouth/Throat:      Mouth: Mucous membranes are moist.   Eyes:      Extraocular Movements: Extraocular movements intact.      Conjunctiva/sclera: Conjunctivae normal.   Pulmonary:      Effort: Pulmonary effort is normal.   Musculoskeletal:         General: Normal range of motion.      Cervical back: Normal range of motion.   Skin:     General: Skin is warm and dry.   Neurological:      General: No focal deficit present.      Mental Status: He is alert and oriented to " person, place, and time.   Psychiatric:         Mood and Affect: Mood normal.         Behavior: Behavior normal.         Thought Content: Thought content normal.       Assessment and Plan:  62 y.o. male with the following issues.    1. Need for vaccination  INFLUENZA VACCINE QUAD INJ (PF)      2. Chronic bilateral low back pain with left-sided sciatica  DX-LUMBAR SPINE-4+ VIEWS      3. Panlobular emphysema (HCC)  PULMONARY FUNCTION TESTS -Test requested: Complete Pulmonary Function Test      4. Asbestos exposure  PULMONARY FUNCTION TESTS -Test requested: Complete Pulmonary Function Test      5. Screening for thyroid disorder  FREE THYROXINE    TSH      6. Encounter for screening for lipid disorder  Lipid Profile      7. Encounter for screening for diabetes mellitus  Comp Metabolic Panel    MICROALBUMIN CREAT RATIO URINE    HEMOGLOBIN A1C      8. Vitamin D deficiency  VITAMIN D,25 HYDROXY (DEFICIENCY)      9. Elevated fasting glucose  MICROALBUMIN CREAT RATIO URINE      10. Family history of diabetes mellitus (DM)             Chronic bilateral low back pain with left-sided sciatica  Chronic unstable. Patient reports that his back pain is worsening. He has been having difficultly with standing after sitting for long periods of time. He still reports that he is having sciatic pain of his left leg. He does try to stretch through out. He denies any numbness or tingling in his feet or legs. He has spasms in his back.     He is a  and drives 500 miles round trip.     Will obtain xray in office in office today. Will provide with muscle relaxer to help with pain.     Panlobular emphysema (HCC)  Chronic stable. Patient reports that he is doing well on trelegy.     Continue trelegy daily.       Return in about 1 month (around 11/23/2023) for low back pain.      Please note that this dictation was created using voice recognition software. I have worked with consultants from the vendor as well as technical experts  from [facility] to optimize the interface. I have made every reasonable attempt to correct obvious errors, but I expect that there are errors of grammar and possibly content that I did not discover before finalizing the note.

## 2023-10-23 NOTE — ASSESSMENT & PLAN NOTE
Chronic unstable. Patient reports that his back pain is worsening. He has been having difficultly with standing after sitting for long periods of time. He still reports that he is having sciatic pain of his left leg. He does try to stretch through out. He denies any numbness or tingling in his feet or legs. He has spasms in his back.     He is a  and drives 500 miles round trip.     Will obtain xray in office in office today. Will provide with muscle relaxer to help with pain.

## 2023-11-06 ENCOUNTER — NON-PROVIDER VISIT (OUTPATIENT)
Dept: SLEEP MEDICINE | Facility: MEDICAL CENTER | Age: 63
End: 2023-11-06
Attending: NURSE PRACTITIONER
Payer: COMMERCIAL

## 2023-11-06 ENCOUNTER — APPOINTMENT (OUTPATIENT)
Dept: MEDICAL GROUP | Facility: PHYSICIAN GROUP | Age: 63
End: 2023-11-06
Payer: COMMERCIAL

## 2023-11-06 VITALS — WEIGHT: 167.9 LBS | BODY MASS INDEX: 23.51 KG/M2 | HEIGHT: 71 IN

## 2023-11-06 DIAGNOSIS — J43.1 PANLOBULAR EMPHYSEMA (HCC): ICD-10-CM

## 2023-11-06 DIAGNOSIS — Z77.090 ASBESTOS EXPOSURE: ICD-10-CM

## 2023-11-06 PROCEDURE — 94729 DIFFUSING CAPACITY: CPT | Mod: 26 | Performed by: INTERNAL MEDICINE

## 2023-11-06 PROCEDURE — 94060 EVALUATION OF WHEEZING: CPT | Mod: 26 | Performed by: INTERNAL MEDICINE

## 2023-11-06 PROCEDURE — 94060 EVALUATION OF WHEEZING: CPT | Performed by: INTERNAL MEDICINE

## 2023-11-06 PROCEDURE — 94726 PLETHYSMOGRAPHY LUNG VOLUMES: CPT | Performed by: INTERNAL MEDICINE

## 2023-11-06 PROCEDURE — 94726 PLETHYSMOGRAPHY LUNG VOLUMES: CPT | Mod: 26 | Performed by: INTERNAL MEDICINE

## 2023-11-06 PROCEDURE — 94729 DIFFUSING CAPACITY: CPT | Performed by: INTERNAL MEDICINE

## 2023-11-06 ASSESSMENT — FIBROSIS 4 INDEX: FIB4 SCORE: 1.32

## 2023-11-06 ASSESSMENT — PULMONARY FUNCTION TESTS
FVC_PERCENT_PREDICTED: 97
FEV1: 3.55
FEV1/FVC_PERCENT_PREDICTED: 77
FEV1: 3.58
FEV1/FVC_PERCENT_PREDICTED: 101
FEV1/FVC_PERCENT_PREDICTED: 102
FEV1_PERCENT_CHANGE: 0
FEV1/FVC: 78.54
FEV1_PREDICTED: 3.59
FEV1/FVC: 78
FEV1/FVC_PERCENT_LLN: 64
FEV1_LLN: 3.00
FEV1/FVC_PREDICTED: 77
FEV1/FVC: 78
FVC: 4.58
FEV1_PERCENT_PREDICTED: 98
FEV1/FVC_PERCENT_CHANGE: 0
FVC_LLN: 3.91
FEV1_PERCENT_CHANGE: -1
FEV1/FVC_PERCENT_CHANGE: 0
FEV1/FVC_PERCENT_PREDICTED: 102
FEV1_PERCENT_PREDICTED: 99
FVC: 4.52
FEV1/FVC_PERCENT_PREDICTED: 102
FVC_PERCENT_PREDICTED: 96
FEV1/FVC: 78
FVC_PREDICTED: 4.68

## 2023-11-06 NOTE — PROCEDURES
Tech: Radha Stack, RT  Good patient effort & cooperation.  Test was performed on the [employer] Body Plethysmograph- Elite DX system.  The predicted sets used for Spirometry are GLI-2012, for Lung Volumes are ITS, and for DLCO is GLI 2017.  The results of this test meet the ATS standards for acceptability and repeatability.  The DLCO was uncorrected for Hb.  A bronchodilator of Ventolin HFA 2 puffs via spacer was administered.  DLCO was performed during dilation period.    Interpretation:   Baseline spirometry shows normal airflows.  No significant bronchodilator response.  Lung volumes are within normal limits.  Diffusion capacity is low normal at 82% predicted.  Normal pulmonary function testing other than low normal DLCO.  Correlate clinically and with imaging.  Normal flow volume loop.

## 2024-02-16 ENCOUNTER — APPOINTMENT (OUTPATIENT)
Dept: MEDICAL GROUP | Facility: PHYSICIAN GROUP | Age: 64
End: 2024-02-16
Payer: COMMERCIAL

## 2024-05-13 NOTE — TELEPHONE ENCOUNTER
Received request via: Patient    Was the patient seen in the last year in this department? Yes    Does the patient have an active prescription (recently filled or refills available) for medication(s) requested? No    [facility]    Does the patient have senior living Plus and need 100 day supply (blood pressure, diabetes and cholesterol meds only)? Patient does not have SCP

## 2024-05-15 RX ORDER — METHOCARBAMOL 750 MG/1
TABLET, FILM COATED ORAL
Qty: 90 TABLET | Refills: 0 | Status: SHIPPED | OUTPATIENT
Start: 2024-05-15

## 2024-06-11 NOTE — TELEPHONE ENCOUNTER
Received request via: Patient    Was the patient seen in the last year in this department? Yes    Does the patient have an active prescription (recently filled or refills available) for medication(s) requested? No    Ph[facility]    Does the patient have detention Plus and need 100 day supply (blood pressure, diabetes and cholesterol meds only)? Patient does not have SCP

## 2024-06-12 RX ORDER — METHOCARBAMOL 750 MG/1
TABLET, FILM COATED ORAL
Qty: 90 TABLET | Refills: 0 | Status: SHIPPED | OUTPATIENT
Start: 2024-06-12 | End: 2024-06-18 | Stop reason: SDUPTHER

## 2024-06-18 ENCOUNTER — APPOINTMENT (OUTPATIENT)
Dept: MEDICAL GROUP | Facility: PHYSICIAN GROUP | Age: 64
End: 2024-06-18
Payer: COMMERCIAL

## 2024-06-18 VITALS
OXYGEN SATURATION: 94 % | HEIGHT: 71 IN | TEMPERATURE: 98.5 F | BODY MASS INDEX: 23.94 KG/M2 | HEART RATE: 86 BPM | RESPIRATION RATE: 16 BRPM | SYSTOLIC BLOOD PRESSURE: 92 MMHG | DIASTOLIC BLOOD PRESSURE: 50 MMHG | WEIGHT: 171 LBS

## 2024-06-18 DIAGNOSIS — Z12.5 ENCOUNTER FOR SCREENING FOR MALIGNANT NEOPLASM OF PROSTATE: ICD-10-CM

## 2024-06-18 DIAGNOSIS — J43.1 PANLOBULAR EMPHYSEMA (HCC): ICD-10-CM

## 2024-06-18 DIAGNOSIS — G89.29 CHRONIC BILATERAL LOW BACK PAIN WITH LEFT-SIDED SCIATICA: ICD-10-CM

## 2024-06-18 DIAGNOSIS — Z13.29 SCREENING FOR THYROID DISORDER: ICD-10-CM

## 2024-06-18 DIAGNOSIS — R73.01 ELEVATED FASTING GLUCOSE: ICD-10-CM

## 2024-06-18 DIAGNOSIS — Z12.2 ENCOUNTER FOR SCREENING FOR LUNG CANCER: ICD-10-CM

## 2024-06-18 DIAGNOSIS — M54.42 CHRONIC BILATERAL LOW BACK PAIN WITH LEFT-SIDED SCIATICA: ICD-10-CM

## 2024-06-18 DIAGNOSIS — R53.83 OTHER FATIGUE: ICD-10-CM

## 2024-06-18 DIAGNOSIS — E78.5 DYSLIPIDEMIA: ICD-10-CM

## 2024-06-18 DIAGNOSIS — Z00.00 ENCOUNTER FOR HEALTH MAINTENANCE EXAMINATION IN ADULT: ICD-10-CM

## 2024-06-18 DIAGNOSIS — R97.20 ELEVATED PSA: ICD-10-CM

## 2024-06-18 DIAGNOSIS — E55.9 VITAMIN D DEFICIENCY: ICD-10-CM

## 2024-06-18 PROCEDURE — 3074F SYST BP LT 130 MM HG: CPT | Performed by: NURSE PRACTITIONER

## 2024-06-18 PROCEDURE — 99214 OFFICE O/P EST MOD 30 MIN: CPT | Performed by: NURSE PRACTITIONER

## 2024-06-18 PROCEDURE — 3078F DIAST BP <80 MM HG: CPT | Performed by: NURSE PRACTITIONER

## 2024-06-18 RX ORDER — METHOCARBAMOL 750 MG/1
750 TABLET, FILM COATED ORAL NIGHTLY
Qty: 90 TABLET | Refills: 3 | Status: SHIPPED | OUTPATIENT
Start: 2024-06-18

## 2024-06-18 RX ORDER — ATORVASTATIN CALCIUM 10 MG/1
10 TABLET, FILM COATED ORAL NIGHTLY
Qty: 90 TABLET | Refills: 3 | Status: SHIPPED | OUTPATIENT
Start: 2024-06-18

## 2024-06-18 ASSESSMENT — PATIENT HEALTH QUESTIONNAIRE - PHQ9: CLINICAL INTERPRETATION OF PHQ2 SCORE: 0

## 2024-06-18 ASSESSMENT — FIBROSIS 4 INDEX: FIB4 SCORE: 1.34
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
## 2024-11-11 ENCOUNTER — APPOINTMENT (OUTPATIENT)
Dept: MEDICAL GROUP | Facility: PHYSICIAN GROUP | Age: 64
End: 2024-11-11
Payer: COMMERCIAL

## 2024-11-16 ENCOUNTER — HOSPITAL ENCOUNTER (OUTPATIENT)
Dept: LAB | Facility: MEDICAL CENTER | Age: 64
End: 2024-11-16
Attending: NURSE PRACTITIONER
Payer: COMMERCIAL

## 2024-11-16 DIAGNOSIS — R73.01 ELEVATED FASTING GLUCOSE: ICD-10-CM

## 2024-11-16 DIAGNOSIS — E55.9 VITAMIN D DEFICIENCY: ICD-10-CM

## 2024-11-16 DIAGNOSIS — E78.5 DYSLIPIDEMIA: ICD-10-CM

## 2024-11-16 DIAGNOSIS — Z13.29 SCREENING FOR THYROID DISORDER: ICD-10-CM

## 2024-11-16 DIAGNOSIS — R97.20 ELEVATED PSA: ICD-10-CM

## 2024-11-16 DIAGNOSIS — R53.83 OTHER FATIGUE: ICD-10-CM

## 2024-11-16 DIAGNOSIS — Z00.00 ENCOUNTER FOR HEALTH MAINTENANCE EXAMINATION IN ADULT: ICD-10-CM

## 2024-11-16 LAB
25(OH)D3 SERPL-MCNC: 29 NG/ML (ref 30–100)
ALBUMIN SERPL BCP-MCNC: 4 G/DL (ref 3.2–4.9)
ALBUMIN/GLOB SERPL: 1.6 G/DL
ALP SERPL-CCNC: 130 U/L (ref 30–99)
ALT SERPL-CCNC: 21 U/L (ref 2–50)
ANION GAP SERPL CALC-SCNC: 9 MMOL/L (ref 7–16)
AST SERPL-CCNC: 23 U/L (ref 12–45)
BASOPHILS # BLD AUTO: 0.4 % (ref 0–1.8)
BASOPHILS # BLD: 0.06 K/UL (ref 0–0.12)
BILIRUB SERPL-MCNC: 1.2 MG/DL (ref 0.1–1.5)
BUN SERPL-MCNC: 13 MG/DL (ref 8–22)
CALCIUM ALBUM COR SERPL-MCNC: 8.9 MG/DL (ref 8.5–10.5)
CALCIUM SERPL-MCNC: 8.9 MG/DL (ref 8.5–10.5)
CHLORIDE SERPL-SCNC: 104 MMOL/L (ref 96–112)
CHOLEST SERPL-MCNC: 120 MG/DL (ref 100–199)
CO2 SERPL-SCNC: 25 MMOL/L (ref 20–33)
CREAT SERPL-MCNC: 1.13 MG/DL (ref 0.5–1.4)
EOSINOPHIL # BLD AUTO: 0.4 K/UL (ref 0–0.51)
EOSINOPHIL NFR BLD: 2.7 % (ref 0–6.9)
ERYTHROCYTE [DISTWIDTH] IN BLOOD BY AUTOMATED COUNT: 41.1 FL (ref 35.9–50)
GFR SERPLBLD CREATININE-BSD FMLA CKD-EPI: 73 ML/MIN/1.73 M 2
GLOBULIN SER CALC-MCNC: 2.5 G/DL (ref 1.9–3.5)
GLUCOSE SERPL-MCNC: 94 MG/DL (ref 65–99)
HCT VFR BLD AUTO: 56.2 % (ref 42–52)
HDLC SERPL-MCNC: 32 MG/DL
HGB BLD-MCNC: 19.1 G/DL (ref 14–18)
IMM GRANULOCYTES # BLD AUTO: 0.07 K/UL (ref 0–0.11)
IMM GRANULOCYTES NFR BLD AUTO: 0.5 % (ref 0–0.9)
LDLC SERPL CALC-MCNC: 57 MG/DL
LYMPHOCYTES # BLD AUTO: 2.78 K/UL (ref 1–4.8)
LYMPHOCYTES NFR BLD: 18.5 % (ref 22–41)
MCH RBC QN AUTO: 31.5 PG (ref 27–33)
MCHC RBC AUTO-ENTMCNC: 34 G/DL (ref 32.3–36.5)
MCV RBC AUTO: 92.7 FL (ref 81.4–97.8)
MONOCYTES # BLD AUTO: 0.78 K/UL (ref 0–0.85)
MONOCYTES NFR BLD AUTO: 5.2 % (ref 0–13.4)
NEUTROPHILS # BLD AUTO: 10.96 K/UL (ref 1.82–7.42)
NEUTROPHILS NFR BLD: 72.7 % (ref 44–72)
NRBC # BLD AUTO: 0 K/UL
NRBC BLD-RTO: 0 /100 WBC (ref 0–0.2)
PLATELET # BLD AUTO: 199 K/UL (ref 164–446)
PMV BLD AUTO: 10.5 FL (ref 9–12.9)
POTASSIUM SERPL-SCNC: 4.6 MMOL/L (ref 3.6–5.5)
PROT SERPL-MCNC: 6.5 G/DL (ref 6–8.2)
PSA SERPL DL<=0.01 NG/ML-MCNC: 1.84 NG/ML (ref 0–4)
RBC # BLD AUTO: 6.06 M/UL (ref 4.7–6.1)
SODIUM SERPL-SCNC: 138 MMOL/L (ref 135–145)
T4 FREE SERPL-MCNC: 1.37 NG/DL (ref 0.93–1.7)
TRIGL SERPL-MCNC: 156 MG/DL (ref 0–149)
TSH SERPL-ACNC: 2.14 UIU/ML (ref 0.35–5.5)
WBC # BLD AUTO: 15.1 K/UL (ref 4.8–10.8)

## 2024-11-16 PROCEDURE — 84153 ASSAY OF PSA TOTAL: CPT

## 2024-11-16 PROCEDURE — 84439 ASSAY OF FREE THYROXINE: CPT

## 2024-11-16 PROCEDURE — 36415 COLL VENOUS BLD VENIPUNCTURE: CPT

## 2024-11-16 PROCEDURE — 80053 COMPREHEN METABOLIC PANEL: CPT

## 2024-11-16 PROCEDURE — 80061 LIPID PANEL: CPT

## 2024-11-16 PROCEDURE — 85025 COMPLETE CBC W/AUTO DIFF WBC: CPT

## 2024-11-16 PROCEDURE — 82306 VITAMIN D 25 HYDROXY: CPT

## 2024-11-16 PROCEDURE — 84443 ASSAY THYROID STIM HORMONE: CPT

## 2024-11-19 ENCOUNTER — OFFICE VISIT (OUTPATIENT)
Dept: MEDICAL GROUP | Facility: PHYSICIAN GROUP | Age: 64
End: 2024-11-19
Payer: COMMERCIAL

## 2024-11-19 VITALS
HEART RATE: 94 BPM | HEIGHT: 71 IN | WEIGHT: 172 LBS | TEMPERATURE: 97.4 F | RESPIRATION RATE: 16 BRPM | DIASTOLIC BLOOD PRESSURE: 64 MMHG | BODY MASS INDEX: 24.08 KG/M2 | SYSTOLIC BLOOD PRESSURE: 104 MMHG | OXYGEN SATURATION: 93 %

## 2024-11-19 DIAGNOSIS — M25.50 POLYARTHRALGIA: ICD-10-CM

## 2024-11-19 DIAGNOSIS — J96.11 CHRONIC RESPIRATORY FAILURE WITH HYPOXIA, ON HOME OXYGEN THERAPY (HCC): ICD-10-CM

## 2024-11-19 DIAGNOSIS — J43.1 PANLOBULAR EMPHYSEMA (HCC): ICD-10-CM

## 2024-11-19 DIAGNOSIS — G47.34 NOCTURNAL HYPOXIA: ICD-10-CM

## 2024-11-19 DIAGNOSIS — R53.82 CHRONIC FATIGUE: ICD-10-CM

## 2024-11-19 DIAGNOSIS — Z86.718 HISTORY OF DVT (DEEP VEIN THROMBOSIS): ICD-10-CM

## 2024-11-19 DIAGNOSIS — Z12.2 SCREENING FOR LUNG CANCER: ICD-10-CM

## 2024-11-19 DIAGNOSIS — D72.825 BANDEMIA: ICD-10-CM

## 2024-11-19 DIAGNOSIS — Z99.81 CHRONIC RESPIRATORY FAILURE WITH HYPOXIA, ON HOME OXYGEN THERAPY (HCC): ICD-10-CM

## 2024-11-19 DIAGNOSIS — F17.210 SMOKING GREATER THAN 40 PACK YEARS: ICD-10-CM

## 2024-11-19 DIAGNOSIS — Z23 NEED FOR VACCINATION: ICD-10-CM

## 2024-11-19 PROBLEM — R97.20 ELEVATED PSA: Status: RESOLVED | Noted: 2020-06-15 | Resolved: 2024-11-19

## 2024-11-19 PROCEDURE — 90656 IIV3 VACC NO PRSV 0.5 ML IM: CPT | Performed by: NURSE PRACTITIONER

## 2024-11-19 PROCEDURE — 90471 IMMUNIZATION ADMIN: CPT | Performed by: NURSE PRACTITIONER

## 2024-11-19 PROCEDURE — 90472 IMMUNIZATION ADMIN EACH ADD: CPT | Performed by: NURSE PRACTITIONER

## 2024-11-19 PROCEDURE — 3078F DIAST BP <80 MM HG: CPT | Performed by: NURSE PRACTITIONER

## 2024-11-19 PROCEDURE — 3074F SYST BP LT 130 MM HG: CPT | Performed by: NURSE PRACTITIONER

## 2024-11-19 PROCEDURE — 99215 OFFICE O/P EST HI 40 MIN: CPT | Mod: 25 | Performed by: NURSE PRACTITIONER

## 2024-11-19 PROCEDURE — 90677 PCV20 VACCINE IM: CPT | Performed by: NURSE PRACTITIONER

## 2024-11-19 ASSESSMENT — FIBROSIS 4 INDEX: FIB4 SCORE: 1.61

## 2024-11-19 NOTE — LETTER
November 19, 2024         Patient: Dionte Rodriges   YOB: 1960   Date of Visit: 11/19/2024           To Whom it May Concern:    Dionte Rodriges was seen in my clinic on 11/19/2024. He is undergoing diagnostic testing and evaluation to identify the cause of chronic symptoms. He has been having severe fatigue. At this time, I do not recommend that the patient drive at night. Please provide the patient with alternative duties. I advise this accomodation for the next 12 weeks. He will be following up with me prior to the end of the accomodation to go over test results and determine if these accommodations will need to be extended.     If you have any questions or concerns, please don't hesitate to call.        Sincerely,           BEHZAD Frye.  Electronically Signed

## 2024-11-19 NOTE — PROGRESS NOTES
Chief Complaint   Patient presents with    Paperwork                                                                                                                                       HPI:   Dionte presents today with the following.      Patient consented to the use of [employer] to record and transcribe notes during this visit.    The patient, Dionte Rodriges, attended the consultation today to discuss his current health status and concerns, particularly focusing on chronic fatigue, cognitive issues, and various physical symptoms affecting his daily life.    The chief complaints presented by the patient include chronic fatigue despite sleeping from 10 PM to 5:30 AM, forgetfulness and brain fog, and suspected sleep apnea. A 2017 nocturnal oximetry showed brief periods of inadequate oxygen levels, though he reports no home oxygen concentrator use.    The patient reports a range of symptoms, including calf and foot cramps for the past 4 months without swelling, hand swelling in one hand, foot numbness when sitting that resolves with shoe removal, joint pain in hands, knees, and feet, localized headaches, and a persistent cough with a history of emphysema and shortness of breath. He also experiences limited mobility, having difficulty running and performing a shuffle instead.    Regarding lifestyle habits, the patient continues to smoke but reports no alcohol or drug use. He takes a daily multivitamin. The patient has a history of poor medication adherence and relies on his wife's reminders to take medications.    The patient's medical history includes elevated hemoglobin levels. His family history is significant, with a sister diagnosed with multiple sclerosis, a youngest brother who had heart surgery and struggles with alcoholism, a mother with an unspecified blood disorder and circulation problems, and both grandmother and mother having osteoporosis.    The patient expresses concerns about developing a stooped posture  "similar to his grandmother and mother, and the possibility of hereditary hemochromatosis. He reports no diagnosed autoimmune conditions and no family history of lupus or rheumatoid arthritis.    The patient's health management strategies and concerns have been discussed, with particular attention to his chronic fatigue, cognitive issues, various physical symptoms, and family history of medical conditions.    ROS:  All systems negative expect as addressed in assessment and plan.     /64 (BP Location: Left arm, Patient Position: Sitting)   Pulse 94   Temp 36.3 °C (97.4 °F) (Temporal)   Resp 16   Ht 1.803 m (5' 11\")   Wt 78 kg (172 lb)   SpO2 93%   BMI 23.99 kg/m²     Objective:    - Physical Examination:    - General: Chronic fatigue reported    - Respiratory: Chronic cough; minimal airflow at bases (likely due to emphysema), no crackles.    - Musculoskeletal: Cramps in calves/feet, hand swelling, chronic joint pain (knees, feet, hands), worse when driving      Diagnostic Test Results:  - Laboratory Tests:    - Recent Labs:      - WBC: Elevated (potential infection)      - Neutrophils: Elevated      - Chemistry Panel: Normal (electrolytes, blood sugar, kidney/liver function)      - Cholesterol: Normal      - Triglycerides: Slightly high, improved      - Vitamin D: Slightly low (1 point below normal)      - PSA: Normal (1.8)      - Thyroid Function: Normal      - Hemoglobin: Elevated, further testing needed    - Previous Tests:    - Nocturnal Oximetry (2017): Majority of sleep with O2 sat below 88%. Average O2 at 87%.     - Scheduled Tests:    - Blood Work (Saturday): Include testosterone and inflammatory markers    - Home Sleep Study: Assess respirations, position, and sleep apnea    - Lung Cancer Screening CT: Appointment in 2 weeks, rescheduling option available    Assessment and Plan:  64 y.o. male with the following issues.    1. Need for vaccination  - INFLUENZA VACCINE TRI INJ (PF)  - Pneumococcal " Conjugate Vaccine 20-Valent (6 wks+)    2. Chronic fatigue  - VITAMIN B12; Future  - IRON/TOTAL IRON BIND; Future  - FERRITIN; Future  - CBC WITH DIFFERENTIAL; Future  - PROCALCITONIN; Future  - CRP QUANTITIVE (NON-CARDIAC); Future  - D-DIMER; Future  - Testosterone, Free & Total, Adult Male (w/SHBG); Future  - ANTINUCLEAR AB (ALISHA), HEP-2, IGG W/RFLX; Future  - ANTICARDIOLIPIN AB IGG,IGM,IGA; Future  - BETA-2 GLYCOPROTEIN AB,IGG,IGM; Future  - LUPUS ANTICOAGULANT; Future  - RHEUMATOID ARTHRITIS FACTOR; Future  - CCP ANTIBODIES, IGG/IGA; Future  - CRP QUANTITIVE (NON-CARDIAC); Future  - Sed Rate; Future    3. Bandemia  - CBC WITH DIFFERENTIAL; Future  - PROCALCITONIN; Future  - CRP QUANTITIVE (NON-CARDIAC); Future  - D-DIMER; Future    4. Nocturnal hypoxia  - ANTINUCLEAR AB (ALISHA), HEP-2, IGG W/RFLX; Future  - ANTICARDIOLIPIN AB IGG,IGM,IGA; Future  - BETA-2 GLYCOPROTEIN AB,IGG,IGM; Future  - LUPUS ANTICOAGULANT; Future  - RHEUMATOID ARTHRITIS FACTOR; Future  - CCP ANTIBODIES, IGG/IGA; Future  - CRP QUANTITIVE (NON-CARDIAC); Future  - Sed Rate; Future  - Referral to Home Oxygen    5. Panlobular emphysema (HCC)  - ANTINUCLEAR AB (ALISHA), HEP-2, IGG W/RFLX; Future  - ANTICARDIOLIPIN AB IGG,IGM,IGA; Future  - BETA-2 GLYCOPROTEIN AB,IGG,IGM; Future  - LUPUS ANTICOAGULANT; Future  - RHEUMATOID ARTHRITIS FACTOR; Future  - CCP ANTIBODIES, IGG/IGA; Future  - CRP QUANTITIVE (NON-CARDIAC); Future  - Sed Rate; Future  - Referral to Home Oxygen    6. Chronic respiratory failure with hypoxia, on home oxygen therapy (HCC)  - ANTINUCLEAR AB (ALISHA), HEP-2, IGG W/RFLX; Future  - ANTICARDIOLIPIN AB IGG,IGM,IGA; Future  - BETA-2 GLYCOPROTEIN AB,IGG,IGM; Future  - LUPUS ANTICOAGULANT; Future  - RHEUMATOID ARTHRITIS FACTOR; Future  - CCP ANTIBODIES, IGG/IGA; Future  - CRP QUANTITIVE (NON-CARDIAC); Future  - Sed Rate; Future  - Referral to Home Oxygen    7. Polyarthralgia  - ANTINUCLEAR AB (ALISHA), HEP-2, IGG W/RFLX; Future  - ANTICARDIOLIPIN  AB IGG,IGM,IGA; Future  - BETA-2 GLYCOPROTEIN AB,IGG,IGM; Future  - LUPUS ANTICOAGULANT; Future  - RHEUMATOID ARTHRITIS FACTOR; Future  - CCP ANTIBODIES, IGG/IGA; Future  - CRP QUANTITIVE (NON-CARDIAC); Future  - Sed Rate; Future    8. History of DVT (deep vein thrombosis)  - ANTINUCLEAR AB (ALISHA), HEP-2, IGG W/RFLX; Future  - ANTICARDIOLIPIN AB IGG,IGM,IGA; Future  - BETA-2 GLYCOPROTEIN AB,IGG,IGM; Future  - LUPUS ANTICOAGULANT; Future  - RHEUMATOID ARTHRITIS FACTOR; Future  - CCP ANTIBODIES, IGG/IGA; Future  - CRP QUANTITIVE (NON-CARDIAC); Future  - Sed Rate; Future    9. Smoking greater than 40 pack years  - CT-LUNG CANCER-SCREENING; Future    10. Screening for lung cancer  - CT-LUNG CANCER-SCREENING; Future    1. Elevated Neutrophil and WBC Count:     - Plan:          a. Check inflammatory markers in labs to investigate potential infection/inflammation.    2. Chronic Fatigue:     - Plan:          a. Rule out hereditary hemochromatosis, low testosterone, and sleep apnea.          b. Order labs: iron, ferritin, testosterone.          c. Consider home sleep study if nocturnal oximetry is abnormal.    3. Muscle Cramps and Joint Pain:     - Plan:          a. Rule out RA and other inflammatory arthritis via labs.    4. Emphysema and Potential Sleep Apnea:     - Plan:          a. Arrange home oxygen for nighttime use.          b. Perform nocturnal oximetry.          c. Consider home sleep study.          d. Schedule pulmonology follow-up if needed.    5. Elevated Hemoglobin:     - Plan:          a. Order labs: iron, ferritin to investigate causes.    6. Vitamin D Deficiency:     - Plan:          a. Continue daily multivitamin.    7. Work Accommodations:     - Plan:          a. Provide light duty notice to employer starting Dec 1, for minimum 12 weeks.          b. Complete [facility] paperwork as needed.    8. RSV Vaccination:     - Plan:          a. Recommend vaccination due to emphysema and increased risk.    9. Lung Cancer  Screening:     - Plan:          a. Reorder CT scan.          b. Schedule with [facility]    Follow-up:     - Plan:          a. Schedule appointment in 2 months to review results and accommodations.          b. Arrange earlier follow-up if abnormal lab results are found.    Return in about 2 months (around 1/19/2025) for follow up labs and chronic fatigue.    I spent a total of 55 minutes with record review, exam, and communication with the patient, communication with other providers, and documentation of this encounter. This does not include time spent on separately billable procedures/tests.    Please note that this dictation was created using voice recognition software. I have worked with consultants from the vendor as well as technical experts from [facility] [employer] to optimize the interface. I have made every reasonable attempt to correct obvious errors, but I expect that there are errors of grammar and possibly content that I did not discover before finalizing the note.

## 2024-11-21 ENCOUNTER — TELEPHONE (OUTPATIENT)
Dept: HEALTH INFORMATION MANAGEMENT | Facility: OTHER | Age: 64
End: 2024-11-21

## 2024-11-22 ENCOUNTER — TELEPHONE (OUTPATIENT)
Dept: HEALTH INFORMATION MANAGEMENT | Facility: OTHER | Age: 64
End: 2024-11-22
Payer: COMMERCIAL

## 2024-11-22 ENCOUNTER — TELEPHONE (OUTPATIENT)
Dept: MEDICAL GROUP | Facility: PHYSICIAN GROUP | Age: 64
End: 2024-11-22
Payer: COMMERCIAL

## 2024-11-22 NOTE — TELEPHONE ENCOUNTER
"Pt dropped off ppw saying \" I went to [facility] yesterday to do physical- to get onto disability showed DrWilliams The test you ordered. He reccommended more under certain ones- if you think these test are warranted please put into mychart- will copy them for blook work\".   LABS:TSH, FT4,FT3,RT3,TPO,TGA   "

## 2024-11-25 ENCOUNTER — HOSPITAL ENCOUNTER (OUTPATIENT)
Dept: LAB | Facility: MEDICAL CENTER | Age: 64
End: 2024-11-25
Attending: NURSE PRACTITIONER
Payer: COMMERCIAL

## 2024-11-25 DIAGNOSIS — M25.50 POLYARTHRALGIA: ICD-10-CM

## 2024-11-25 DIAGNOSIS — Z99.81 CHRONIC RESPIRATORY FAILURE WITH HYPOXIA, ON HOME OXYGEN THERAPY (HCC): ICD-10-CM

## 2024-11-25 DIAGNOSIS — R53.82 CHRONIC FATIGUE: ICD-10-CM

## 2024-11-25 DIAGNOSIS — D72.825 BANDEMIA: ICD-10-CM

## 2024-11-25 DIAGNOSIS — G47.34 NOCTURNAL HYPOXIA: ICD-10-CM

## 2024-11-25 DIAGNOSIS — J96.11 CHRONIC RESPIRATORY FAILURE WITH HYPOXIA, ON HOME OXYGEN THERAPY (HCC): ICD-10-CM

## 2024-11-25 DIAGNOSIS — Z86.718 HISTORY OF DVT (DEEP VEIN THROMBOSIS): ICD-10-CM

## 2024-11-25 DIAGNOSIS — J43.1 PANLOBULAR EMPHYSEMA (HCC): ICD-10-CM

## 2024-11-25 LAB
BASOPHILS # BLD AUTO: 0.4 % (ref 0–1.8)
BASOPHILS # BLD: 0.04 K/UL (ref 0–0.12)
CRP SERPL HS-MCNC: <0.3 MG/DL (ref 0–0.75)
D DIMER PPP IA.FEU-MCNC: 0.56 UG/ML (FEU) (ref 0–0.5)
EOSINOPHIL # BLD AUTO: 0.47 K/UL (ref 0–0.51)
EOSINOPHIL NFR BLD: 4.7 % (ref 0–6.9)
ERYTHROCYTE [DISTWIDTH] IN BLOOD BY AUTOMATED COUNT: 38.9 FL (ref 35.9–50)
ERYTHROCYTE [SEDIMENTATION RATE] IN BLOOD BY WESTERGREN METHOD: 2 MM/HOUR (ref 0–20)
FERRITIN SERPL-MCNC: 100 NG/ML (ref 22–322)
HCT VFR BLD AUTO: 56.6 % (ref 42–52)
HGB BLD-MCNC: 19.5 G/DL (ref 14–18)
IMM GRANULOCYTES # BLD AUTO: 0.04 K/UL (ref 0–0.11)
IMM GRANULOCYTES NFR BLD AUTO: 0.4 % (ref 0–0.9)
IRON SATN MFR SERPL: 41 % (ref 15–55)
IRON SERPL-MCNC: 128 UG/DL (ref 50–180)
LYMPHOCYTES # BLD AUTO: 2.17 K/UL (ref 1–4.8)
LYMPHOCYTES NFR BLD: 21.5 % (ref 22–41)
MCH RBC QN AUTO: 31.2 PG (ref 27–33)
MCHC RBC AUTO-ENTMCNC: 34.5 G/DL (ref 32.3–36.5)
MCV RBC AUTO: 90.6 FL (ref 81.4–97.8)
MONOCYTES # BLD AUTO: 0.51 K/UL (ref 0–0.85)
MONOCYTES NFR BLD AUTO: 5.1 % (ref 0–13.4)
NEUTROPHILS # BLD AUTO: 6.86 K/UL (ref 1.82–7.42)
NEUTROPHILS NFR BLD: 67.9 % (ref 44–72)
NRBC # BLD AUTO: 0 K/UL
NRBC BLD-RTO: 0 /100 WBC (ref 0–0.2)
PLATELET # BLD AUTO: 201 K/UL (ref 164–446)
PMV BLD AUTO: 10.1 FL (ref 9–12.9)
PROCALCITONIN SERPL-MCNC: 0.05 NG/ML
RBC # BLD AUTO: 6.25 M/UL (ref 4.7–6.1)
RHEUMATOID FACT SER IA-ACNC: <10 IU/ML (ref 0–14)
TIBC SERPL-MCNC: 312 UG/DL (ref 250–450)
UIBC SERPL-MCNC: 184 UG/DL (ref 110–370)
VIT B12 SERPL-MCNC: 1328 PG/ML (ref 211–911)
WBC # BLD AUTO: 10.1 K/UL (ref 4.8–10.8)

## 2024-11-25 PROCEDURE — 86039 ANTINUCLEAR ANTIBODIES (ANA): CPT

## 2024-11-25 PROCEDURE — 82607 VITAMIN B-12: CPT

## 2024-11-25 PROCEDURE — 86146 BETA-2 GLYCOPROTEIN ANTIBODY: CPT | Mod: 91

## 2024-11-25 PROCEDURE — 85025 COMPLETE CBC W/AUTO DIFF WBC: CPT

## 2024-11-25 PROCEDURE — 84403 ASSAY OF TOTAL TESTOSTERONE: CPT

## 2024-11-25 PROCEDURE — 85520 HEPARIN ASSAY: CPT

## 2024-11-25 PROCEDURE — 83540 ASSAY OF IRON: CPT

## 2024-11-25 PROCEDURE — 85379 FIBRIN DEGRADATION QUANT: CPT

## 2024-11-25 PROCEDURE — 86200 CCP ANTIBODY: CPT

## 2024-11-25 PROCEDURE — 85730 THROMBOPLASTIN TIME PARTIAL: CPT

## 2024-11-25 PROCEDURE — 83550 IRON BINDING TEST: CPT

## 2024-11-25 PROCEDURE — 85610 PROTHROMBIN TIME: CPT

## 2024-11-25 PROCEDURE — 84145 PROCALCITONIN (PCT): CPT

## 2024-11-25 PROCEDURE — 82728 ASSAY OF FERRITIN: CPT

## 2024-11-25 PROCEDURE — 85652 RBC SED RATE AUTOMATED: CPT

## 2024-11-25 PROCEDURE — 85613 RUSSELL VIPER VENOM DILUTED: CPT

## 2024-11-25 PROCEDURE — 86140 C-REACTIVE PROTEIN: CPT

## 2024-11-25 PROCEDURE — 36415 COLL VENOUS BLD VENIPUNCTURE: CPT

## 2024-11-25 PROCEDURE — 84270 ASSAY OF SEX HORMONE GLOBUL: CPT

## 2024-11-25 PROCEDURE — 84402 ASSAY OF FREE TESTOSTERONE: CPT

## 2024-11-25 PROCEDURE — 86147 CARDIOLIPIN ANTIBODY EA IG: CPT

## 2024-11-25 PROCEDURE — 86431 RHEUMATOID FACTOR QUANT: CPT

## 2024-11-26 LAB
APTT PPP: 32 SEC (ref 24.7–36)
B2 GLYCOPROT1 IGG SERPL IA-ACNC: <10 SGU
B2 GLYCOPROT1 IGM SERPL IA-ACNC: <10 SMU
CCP IGA+IGG SERPL IA-ACNC: 3 UNITS (ref 0–19)
INR PPP: 1.01 (ref 0.87–1.13)
LA PPP-IMP: NORMAL
LMWH PPP CHRO-ACNC: <0.1 U/ML
PROTHROMBIN TIME: 13.4 SEC (ref 12–14.6)
SCREEN DRVVT: 40.7 SEC (ref 28–48)
SHBG SERPL-SCNC: 38 NMOL/L (ref 19–76)
TESTOST FREE MFR SERPL: 1.8 % (ref 1.6–2.9)
TESTOST FREE SERPL-MCNC: 99 PG/ML (ref 47–244)
TESTOST SERPL-MCNC: 555 NG/DL (ref 300–720)

## 2024-11-27 LAB
ANA PAT SER IF-IMP: NORMAL
CARDIOLIPIN IGA SER IA-ACNC: <10 APL
CARDIOLIPIN IGG SER IA-ACNC: <10 GPL
CARDIOLIPIN IGM SER IA-ACNC: <10 MPL
NUCLEAR IGG SER QL IF: NORMAL

## 2024-12-13 PROBLEM — M47.817 ARTHRITIS OF LUMBOSACRAL SPINE: Status: ACTIVE | Noted: 2024-12-13

## 2024-12-27 DIAGNOSIS — J43.1 PANLOBULAR EMPHYSEMA (HCC): ICD-10-CM

## 2024-12-31 ENCOUNTER — OFFICE VISIT (OUTPATIENT)
Dept: MEDICAL GROUP | Facility: PHYSICIAN GROUP | Age: 64
End: 2024-12-31
Payer: COMMERCIAL

## 2024-12-31 VITALS
OXYGEN SATURATION: 94 % | DIASTOLIC BLOOD PRESSURE: 68 MMHG | TEMPERATURE: 98.4 F | BODY MASS INDEX: 23.8 KG/M2 | RESPIRATION RATE: 16 BRPM | SYSTOLIC BLOOD PRESSURE: 102 MMHG | HEART RATE: 88 BPM | HEIGHT: 71 IN | WEIGHT: 170 LBS

## 2024-12-31 DIAGNOSIS — J43.1 PANLOBULAR EMPHYSEMA (HCC): ICD-10-CM

## 2024-12-31 DIAGNOSIS — M47.817 ARTHRITIS OF LUMBOSACRAL SPINE: ICD-10-CM

## 2024-12-31 DIAGNOSIS — E78.5 DYSLIPIDEMIA: ICD-10-CM

## 2024-12-31 DIAGNOSIS — R53.83 OTHER FATIGUE: ICD-10-CM

## 2024-12-31 DIAGNOSIS — G47.34 NOCTURNAL HYPOXIA: ICD-10-CM

## 2024-12-31 PROCEDURE — 3074F SYST BP LT 130 MM HG: CPT | Performed by: NURSE PRACTITIONER

## 2024-12-31 PROCEDURE — 99214 OFFICE O/P EST MOD 30 MIN: CPT | Performed by: NURSE PRACTITIONER

## 2024-12-31 PROCEDURE — 3078F DIAST BP <80 MM HG: CPT | Performed by: NURSE PRACTITIONER

## 2024-12-31 ASSESSMENT — FIBROSIS 4 INDEX: FIB4 SCORE: 1.6

## 2024-12-31 NOTE — PROGRESS NOTES
"  Chief Complaint   Patient presents with   • Lab Results                                                                                                                                       HPI:   Dionte presents today with the following.    ***    ROS:  All systems negative expect as addressed in assessment and plan.     /68 (BP Location: Left arm, Patient Position: Sitting)   Pulse 88   Temp 36.9 °C (98.4 °F) (Temporal)   Resp 16   Ht 1.803 m (5' 11\")   Wt 77.1 kg (170 lb)   SpO2 94%   BMI 23.71 kg/m²     Objective:    Physical Exam      ***    Assessment and Plan:  64 y.o. male with the following issues.    There are no diagnoses linked to this encounter.      ***    No follow-ups on file.    I spent a total of *** minutes with record review, exam, and communication with the patient, communication with other providers, and documentation of this encounter. This does not include time spent on separately billable procedures/tests.    Please note that this dictation was created using voice recognition software. I have worked with consultants from the vendor as well as technical experts from [facility] to optimize the interface. I have made every reasonable attempt to correct obvious errors, but I expect that there are errors of grammar and possibly content that I did not discover before finalizing the note.       " "assistance programs.    Note: The patient consented to the use of [employer] to record and transcribe notes during this visit.    ROS:  All systems negative expect as addressed in assessment and plan.     /68 (BP Location: Left arm, Patient Position: Sitting)   Pulse 88   Temp 36.9 °C (98.4 °F) (Temporal)   Resp 16   Ht 1.803 m (5' 11\")   Wt 77.1 kg (170 lb)   SpO2 94%   BMI 23.71 kg/m²     Objective:    - Physical Examination:    - General: No acute distress    - Respiratory: SOB managed with Trelegy since June    - Musculoskeletal: Joint and back pain, plantar fasciitis causing heel pain    - Neurological: Lack of energy, possibly due to nocturnal hypoxia    Diagnostic Test Results:  - Laboratory Tests:    - CBC:      - RBCs: Normal size, shape, and color      - WBCs: Normal, no infection signs      - Hemoglobin: Slightly elevated, consistent with smoking history, COPD, and sleep apnea    - Iron Levels: Normal    - D-dimer: Slightly elevated, normal for age and history    - Vitamin B12: Normal, likely from multivitamin intake    - Testosterone: Normal    - Upcoming Tests:    - Chest imaging and diagnostics at [facility] in 1-2 weeks, approved, 100% company coverage    Assessment and Plan:  64 y.o. male with the following issues.    1. Nocturnal hypoxia    2. Other fatigue    3. Panlobular emphysema (HCC)    4. Dyslipidemia    5. Arthritis of lumbosacral spine    1. Short-term Disability Claim Issue:     - Assessment: Paperwork faxed multiple times; insurance company claims not received.     - Plan:          a. Patient to follow up with insurance by Thursday if no response.    2. Sleep Apnea and Hypoxia:     - Assessment: No improvement in physical abilities; difficulty establishing sleep pattern. Prescribed O2 not delivered yet.     - Plan:          a. Patient to follow up with pulmonology for testing and O2.          b. Upcoming chest imaging at [facility].    3. " Elevated Hemoglobin:     - Assessment: Likely due to smoking, COPD, sleep apnea, and other conditions. Nocturnal O2 desaturation may contribute.     - Plan:          a. Monitor for changes in hemoglobin levels.    4. Plantar Fasciitis:     - Plan:          a. Advise good arch support, stretching, and decrease foot inflammation.          b. Use frozen water bottle for massage/stretching.          c. Consider night splint.          d. Use belt for gentle foot stretching while sitting.    5. SOB (Shortness of Breath):     - Assessment: Currently on Trelegy (started June).     - Plan:          a. Explore patient assistance programs for medication costs.    6. Joint and Back Pain:     - Assessment: May be related to low O2 levels or separate issues.     - Plan:          a. Further evaluation if pain persists or worsens.    Return in about 1 month (around 1/31/2025).      Please note that this dictation was created using voice recognition software. I have worked with consultants from the vendor as well as technical experts from [facility] to optimize the interface. I have made every reasonable attempt to correct obvious errors, but I expect that there are errors of grammar and possibly content that I did not discover before finalizing the note.

## 2025-01-03 RX ORDER — FLUTICASONE FUROATE, UMECLIDINIUM BROMIDE AND VILANTEROL TRIFENATATE 100; 62.5; 25 UG/1; UG/1; UG/1
1 POWDER RESPIRATORY (INHALATION)
Qty: 180 EACH | Refills: 0 | Status: SHIPPED | OUTPATIENT
Start: 2025-01-03

## 2025-01-03 NOTE — TELEPHONE ENCOUNTER
Received request via: Patient    Was the patient seen in the last year in this department? Yes    Does the patient have an active prescription (recently filled or refills available) for medication(s) requested? No    Pharm[facility] Name: [facility]    Does the patient have USP Plus and need 100-day supply? (This applies to ALL medications) Patient does not have SCP

## 2025-01-21 ENCOUNTER — APPOINTMENT (OUTPATIENT)
Dept: MEDICAL GROUP | Facility: PHYSICIAN GROUP | Age: 65
End: 2025-01-21
Payer: COMMERCIAL

## 2025-01-23 ENCOUNTER — OFFICE VISIT (OUTPATIENT)
Dept: MEDICAL GROUP | Facility: PHYSICIAN GROUP | Age: 65
End: 2025-01-23
Payer: COMMERCIAL

## 2025-01-23 VITALS
HEART RATE: 84 BPM | TEMPERATURE: 98.4 F | RESPIRATION RATE: 14 BRPM | DIASTOLIC BLOOD PRESSURE: 66 MMHG | HEIGHT: 71 IN | BODY MASS INDEX: 23.8 KG/M2 | WEIGHT: 170 LBS | OXYGEN SATURATION: 94 % | SYSTOLIC BLOOD PRESSURE: 108 MMHG

## 2025-01-23 DIAGNOSIS — Z02.89 ENCOUNTER FOR COMPLETION OF FORM WITH PATIENT: ICD-10-CM

## 2025-01-23 DIAGNOSIS — F17.200 SMOKER: ICD-10-CM

## 2025-01-23 DIAGNOSIS — J43.1 PANLOBULAR EMPHYSEMA (HCC): ICD-10-CM

## 2025-01-23 DIAGNOSIS — M47.817 ARTHRITIS OF LUMBOSACRAL SPINE: ICD-10-CM

## 2025-01-23 PROCEDURE — 99214 OFFICE O/P EST MOD 30 MIN: CPT

## 2025-01-23 PROCEDURE — 3074F SYST BP LT 130 MM HG: CPT

## 2025-01-23 PROCEDURE — 3078F DIAST BP <80 MM HG: CPT

## 2025-01-23 ASSESSMENT — FIBROSIS 4 INDEX: FIB4 SCORE: 1.6

## 2025-01-23 ASSESSMENT — ENCOUNTER SYMPTOMS: BACK PAIN: 1

## 2025-01-23 ASSESSMENT — PATIENT HEALTH QUESTIONNAIRE - PHQ9: CLINICAL INTERPRETATION OF PHQ2 SCORE: 0

## 2025-01-23 NOTE — PROGRESS NOTES
"  Verbal consent was acquired by the patient to use [employer] ambient listening note generation during this visit     Subjective:     CC: Diagnoses of Arthritis of lumbosacral spine, Smoker, Panlobular emphysema (HCC), and Encounter for completion of form with patient were pertinent to this visit.    HPI:   Dionte presents today requesting extension of leave due to ongoing fatigue, back pain. He is a patient of Genia Patrick, who has been working with him for testing/evaluation and treatment of these symptoms which started 11/2024 and have not resolved.    History of Present Illness      Current Outpatient Medications   Medication Sig Dispense Refill    fluticasone-umeclidinium-vilanterol (TRELEGY ELLIPTA) 100-62.5-25 mcg/act inhaler INHALE 1 PUFF BY MOUTH EVERY  Each 0    atorvastatin (LIPITOR) 10 MG Tab Take 1 Tablet by mouth every evening. 90 Tablet 3    methocarbamol (ROBAXIN) 750 MG Tab Take 1 Tablet by mouth every evening. 90 Tablet 3    aspirin (ASA) 81 MG Chew Tab chewable tablet Chew 81 mg every day.      Multiple Vitamins-Minerals (CENTRUM SILVER PO) Take  by mouth.       No current facility-administered medications for this visit.       Problem   Arthritis of Lumbosacral Spine   Panlobular Emphysema (Hcc)    Seen on CT lungs      Smoker     ROS:  Review of Systems   Constitutional:  Positive for malaise/fatigue.   Musculoskeletal:  Positive for back pain.   All other systems reviewed and are negative.      Objective:     Exam:  /66 (BP Location: Right arm, Patient Position: Sitting, BP Cuff Size: Adult)   Pulse 84   Temp 36.9 °C (98.4 °F) (Temporal)   Resp 14   Ht 1.803 m (5' 11\")   Wt 77.1 kg (170 lb)   SpO2 94%   BMI 23.71 kg/m²  Body mass index is 23.71 kg/m².    Physical Exam  Vitals reviewed.   Constitutional:       General: He is not in acute distress.     Appearance: Normal appearance. He is not ill-appearing.   HENT:      Head: Normocephalic and atraumatic. "   Cardiovascular:      Rate and Rhythm: Normal rate.      Pulses: Normal pulses.   Pulmonary:      Effort: Pulmonary effort is normal. No respiratory distress.   Skin:     General: Skin is warm and dry.      Findings: No rash.   Neurological:      General: No focal deficit present.      Mental Status: He is alert and oriented to person, place, and time.   Psychiatric:         Mood and Affect: Mood normal.         Behavior: Behavior normal.       Assessment & Plan:     64 y.o. male with the following -     Assessment & Plan      Problem List Items Addressed This Visit       Smoker     Reviewed LDCT, no nodules present. Continue annual screening.          Panlobular emphysema (HCC)     Chronic, stable. Continue trelegy ellipta 100/62.5/25.           Arthritis of lumbosacral spine     Chronic, ongoing. Continue conservative treatment, continue methocarbamol 750 mg nighlty, acetaminophen/ibuprofen as needed          Other Visit Diagnoses       Encounter for completion of form with patient              Patient was educated in proper administration of medication(s) ordered today including safety, possible SE, risks, benefits, rationale and alternatives to therapy.   Supportive care, differential diagnoses, and indications for immediate follow-up discussed with patient.    Pathogenesis of diagnosis discussed including typical length and natural progression.    Instructed to return to clinic or nearest emergency department for any change in condition, further concerns, or worsening of symptoms.  Patient states understanding of the plan of care and discharge instructions.    Return in about 11 days (around 2/3/2025), or if symptoms worsen or fail to improve.    Please note that this dictation was created using voice recognition software. I have made every reasonable attempt to correct obvious errors, but I expect that there are errors of grammar and possibly content that I did not discover before finalizing the note.

## 2025-01-23 NOTE — ASSESSMENT & PLAN NOTE
Chronic, ongoing. Continue conservative treatment, continue methocarbamol 750 mg nighlty, acetaminophen/ibuprofen as needed

## 2025-01-30 ENCOUNTER — TELEPHONE (OUTPATIENT)
Dept: MEDICAL GROUP | Facility: PHYSICIAN GROUP | Age: 65
End: 2025-01-30
Payer: COMMERCIAL

## 2025-01-30 NOTE — TELEPHONE ENCOUNTER
Hello,     Pt came in office regarding his visit on 01/23/2025. Pt states Derrick has not received his disability paperwork after the visit. No fax confirmation with paperwork was scanned in.     Please advise.   Thank you.

## 2025-02-04 ENCOUNTER — APPOINTMENT (OUTPATIENT)
Dept: MEDICAL GROUP | Facility: PHYSICIAN GROUP | Age: 65
End: 2025-02-04
Payer: COMMERCIAL

## 2025-02-04 VITALS
DIASTOLIC BLOOD PRESSURE: 64 MMHG | HEART RATE: 82 BPM | RESPIRATION RATE: 18 BRPM | SYSTOLIC BLOOD PRESSURE: 106 MMHG | TEMPERATURE: 98.2 F | WEIGHT: 162 LBS | BODY MASS INDEX: 23.19 KG/M2 | OXYGEN SATURATION: 94 % | HEIGHT: 70 IN

## 2025-02-04 DIAGNOSIS — G89.29 CHRONIC BILATERAL LOW BACK PAIN WITH LEFT-SIDED SCIATICA: ICD-10-CM

## 2025-02-04 DIAGNOSIS — Z02.89 ENCOUNTER FOR COMPLETION OF FORM WITH PATIENT: ICD-10-CM

## 2025-02-04 DIAGNOSIS — M79.671 BILATERAL FOOT PAIN: ICD-10-CM

## 2025-02-04 DIAGNOSIS — J43.1 PANLOBULAR EMPHYSEMA (HCC): ICD-10-CM

## 2025-02-04 DIAGNOSIS — M72.2 PLANTAR FASCIITIS: ICD-10-CM

## 2025-02-04 DIAGNOSIS — M47.817 ARTHRITIS OF LUMBOSACRAL SPINE: ICD-10-CM

## 2025-02-04 DIAGNOSIS — G47.34 NOCTURNAL HYPOXIA: ICD-10-CM

## 2025-02-04 DIAGNOSIS — M54.42 CHRONIC BILATERAL LOW BACK PAIN WITH LEFT-SIDED SCIATICA: ICD-10-CM

## 2025-02-04 DIAGNOSIS — M79.672 BILATERAL FOOT PAIN: ICD-10-CM

## 2025-02-04 DIAGNOSIS — R53.83 OTHER FATIGUE: ICD-10-CM

## 2025-02-04 DIAGNOSIS — Z77.090 ASBESTOS EXPOSURE: ICD-10-CM

## 2025-02-04 PROCEDURE — 99214 OFFICE O/P EST MOD 30 MIN: CPT

## 2025-02-04 PROCEDURE — 3074F SYST BP LT 130 MM HG: CPT

## 2025-02-04 PROCEDURE — 3078F DIAST BP <80 MM HG: CPT

## 2025-02-04 ASSESSMENT — FIBROSIS 4 INDEX: FIB4 SCORE: 1.6

## 2025-02-04 ASSESSMENT — ENCOUNTER SYMPTOMS
NERVOUS/ANXIOUS: 0
PALPITATIONS: 0
MYALGIAS: 0
INSOMNIA: 1
SHORTNESS OF BREATH: 0
DEPRESSION: 0
NECK PAIN: 0
WHEEZING: 0
BACK PAIN: 1
COUGH: 0
WEIGHT LOSS: 0
HEADACHES: 0

## 2025-02-04 NOTE — PROGRESS NOTES
Verbal consent was acquired by the patient to use [employer] ambient listening note generation during this visit     Subjective:     Chief Complaint   Patient presents with    Paperwork     History of Present Illness  The patient is a 64-year-old established male patient of ISATU Yeh, who is here following up on paperwork for FMLA/disability.    He has been diagnosed with emphysema and was prescribed oxygen therapy in November 2024. He underwent a CT scan of his lungs approximately 2 to 3 weeks ago but does not regularly consult with a pulmonologist. He has a history of asbestos exposure due to his work environment for about 10 years, necessitating annual lung examinations. A pulmonary function test was conducted in 2023, but no oxygen walk test was performed due to this issue of nocturnal hypoxia.     He is currently on Trelegy, which he administers nightly before sleep. He aims to maintain a minimum of 7 hours of sleep without smoking. His oxygen saturation levels are typically above 92. A home sleep study was conducted 2 to 3 years ago. He experiences frequent tossing and turning during sleep and requires a fan directed at him to facilitate sleep. He often wakes up in the middle of the night and struggles to return to sleep. He attributes this to his  training, which required him to stay awake for extended periods. He also reports episodes of sudden fatigue while at home.    Over the past 4 months, he has been experiencing difficulty walking due to sudden heel pain, which his PCP suspects may be plantar fasciitis. He has been advised to continue wearing his current footwear. 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 He also experiences pain when stepping incorrectly or on uneven surfaces. 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 He experiences bilateral foot pain, which he  "believes is exacerbated by prolonged walking on concrete surfaces. He also reports that his  service and age may be contributing factors. He experiences radiating pain from his feet to his kneecap and is cautious when standing to prevent potential falls.    He takes methocarbamol only at night due to its sedative effects. He also takes low-dose aspirin and cholesterol medication before bedtime. He had a hernia in 2000. He had an x-ray of his back in 2023, which determined that he had chronic arthritis in his back.    SOCIAL HISTORY  The patient admits to smoking. He does not drink alcohol.    MEDICATIONS  Trelegy, methocarbamol, low dose aspirin    Allergies: Donnatal    Review of Systems   Constitutional:  Positive for malaise/fatigue. Negative for weight loss.   Respiratory:  Negative for cough, shortness of breath and wheezing.    Cardiovascular:  Negative for chest pain, palpitations and leg swelling.   Musculoskeletal:  Positive for back pain and joint pain. Negative for myalgias and neck pain.   Neurological:  Negative for headaches.   Psychiatric/Behavioral:  Negative for depression. The patient has insomnia. The patient is not nervous/anxious.      Health Maintenance: Deferred   Objective:     /64 (BP Location: Left arm, Patient Position: Sitting, BP Cuff Size: Adult)   Pulse 82   Temp 36.8 °C (98.2 °F) (Temporal)   Resp 18   Ht 1.778 m (5' 10\")   Wt 73.5 kg (162 lb)   SpO2 94%   BMI 23.24 kg/m²  Body mass index is 23.24 kg/m².     Physical Exam  Vitals reviewed.   Constitutional:       General: He is not in acute distress.     Appearance: Normal appearance. He is not ill-appearing.   Cardiovascular:      Rate and Rhythm: Normal rate and regular rhythm.   Pulmonary:      Effort: Pulmonary effort is normal. No respiratory distress.      Breath sounds: Examination of the right-lower field reveals decreased breath sounds. Examination of the left-lower field reveals decreased breath sounds. " Decreased breath sounds present.   Musculoskeletal:      Right foot: Normal range of motion. Tenderness present. No swelling, deformity or bony tenderness.      Left foot: Normal range of motion. Tenderness present. No swelling, deformity or bony tenderness.   Feet:      Right foot:      Skin integrity: Skin integrity normal.      Left foot:      Skin integrity: Skin integrity normal.   Skin:     Coloration: Skin is not jaundiced or pale.   Neurological:      General: No focal deficit present.      Mental Status: He is alert and oriented to person, place, and time.   Psychiatric:         Mood and Affect: Mood normal.         Behavior: Behavior normal.         Thought Content: Thought content normal.         Judgment: Judgment normal.        Results for orders placed or performed during the hospital encounter of 11/25/24   LUPUS ANTICOAGULANT    Collection Time: 11/25/24 10:00 AM   Result Value Ref Range    PT 13.4 12.0 - 14.6 sec    INR 1.01 0.87 - 1.13    APTT 32.0 24.7 - 36.0 sec    Heparin Xa (LMWH) <0.1 U/mL    Dilute Jim Viper Venom Alireza 40.7 28.0 - 48.0 sec    Lupus Anticoagulant Not Present    Sed Rate    Collection Time: 11/25/24 10:01 AM   Result Value Ref Range    Sed Rate Westergren 2 0 - 20 mm/hour   CRP QUANTITIVE (NON-CARDIAC)    Collection Time: 11/25/24 10:01 AM   Result Value Ref Range    Stat C-Reactive Protein <0.30 0.00 - 0.75 mg/dL   CCP ANTIBODIES, IGG/IGA    Collection Time: 11/25/24 10:01 AM   Result Value Ref Range    Cyclic Citrullinated Peptide Ab, IgG/A 3 0 - 19 Units   RHEUMATOID ARTHRITIS FACTOR    Collection Time: 11/25/24 10:01 AM   Result Value Ref Range    Rheumatoid Factor -Neph- <10 0 - 14 IU/mL   BETA-2 GLYCOPROTEIN AB,IGG,IGM    Collection Time: 11/25/24 10:01 AM   Result Value Ref Range    Beta-2 Glycoprotein I Ab Igg <10 <=20 SGU    Beta-2 Glycoprotein I Igm <10 <=20 SMU   ANTICARDIOLIPIN AB IGG,IGM,IGA    Collection Time: 11/25/24 10:01 AM   Result Value Ref Range     Anti-Cariolipin Ab Igg <10 <=14 GPL    Anti-Cardiolipin Ab Igm <10 <=12 MPL    Anti-Cardiolipin Ab Iga <10 <=11 APL   ANTINUCLEAR AB (ALISHA), HEP-2, IGG W/RFLX    Collection Time: 11/25/24 10:01 AM   Result Value Ref Range    Antinuclear Antibody (ALISHA), HEp-2, IgG <1:80 <1:80    ALISHA Interpretive Comment See Note    Testosterone, Free & Total, Adult Male (w/SHBG)    Collection Time: 11/25/24 10:01 AM   Result Value Ref Range    Testosterone,Total 555 300 - 720 ng/dL    Sex Hormone Bind Globulin 38 19 - 76 nmol/L    Free Testosterone 99 47 - 244 pg/mL    Testosterone % Free 1.8 1.6 - 2.9 %   D-DIMER    Collection Time: 11/25/24 10:01 AM   Result Value Ref Range    D-Dimer 0.56 (H) 0.00 - 0.50 ug/mL (FEU)   PROCALCITONIN    Collection Time: 11/25/24 10:01 AM   Result Value Ref Range    Procalcitonin 0.05 <0.25 ng/mL   CBC WITH DIFFERENTIAL    Collection Time: 11/25/24 10:01 AM   Result Value Ref Range    WBC 10.1 4.8 - 10.8 K/uL    RBC 6.25 (H) 4.70 - 6.10 M/uL    Hemoglobin 19.5 (H) 14.0 - 18.0 g/dL    Hematocrit 56.6 (H) 42.0 - 52.0 %    MCV 90.6 81.4 - 97.8 fL    MCH 31.2 27.0 - 33.0 pg    MCHC 34.5 32.3 - 36.5 g/dL    RDW 38.9 35.9 - 50.0 fL    Platelet Count 201 164 - 446 K/uL    MPV 10.1 9.0 - 12.9 fL    Neutrophils-Polys 67.90 44.00 - 72.00 %    Lymphocytes 21.50 (L) 22.00 - 41.00 %    Monocytes 5.10 0.00 - 13.40 %    Eosinophils 4.70 0.00 - 6.90 %    Basophils 0.40 0.00 - 1.80 %    Immature Granulocytes 0.40 0.00 - 0.90 %    Nucleated RBC 0.00 0.00 - 0.20 /100 WBC    Neutrophils (Absolute) 6.86 1.82 - 7.42 K/uL    Lymphs (Absolute) 2.17 1.00 - 4.80 K/uL    Monos (Absolute) 0.51 0.00 - 0.85 K/uL    Eos (Absolute) 0.47 0.00 - 0.51 K/uL    Baso (Absolute) 0.04 0.00 - 0.12 K/uL    Immature Granulocytes (abs) 0.04 0.00 - 0.11 K/uL    NRBC (Absolute) 0.00 K/uL   FERRITIN    Collection Time: 11/25/24 10:01 AM   Result Value Ref Range    Ferritin 100.0 22.0 - 322.0 ng/mL   IRON/TOTAL IRON BIND    Collection Time:  11/25/24 10:01 AM   Result Value Ref Range    Iron 128 50 - 180 ug/dL    Total Iron Binding 312 250 - 450 ug/dL    Unsat Iron Binding 184 110 - 370 ug/dL    % Saturation 41 15 - 55 %   VITAMIN B12    Collection Time: 11/25/24 10:01 AM   Result Value Ref Range    Vitamin B12 -True Cobalamin 1328 (H) 211 - 911 pg/mL      Assessment and Plan:     The following treatment plan was discussed through shared decision making with the patient:    1. Panlobular emphysema (HCC)  Referral to Pulmonary and Sleep Medicine      2. Nocturnal hypoxia  Referral to Pulmonary and Sleep Medicine      3. Other fatigue        4. Asbestos exposure  Referral to Pulmonary and Sleep Medicine      5. Bilateral foot pain  Referral to Podiatry      6. Plantar fasciitis  Referral to Podiatry      7. Chronic bilateral low back pain with left-sided sciatica        8. Arthritis of lumbosacral spine        9. Encounter for completion of form with patient          Assessment & Plan  Chronic Obstructive Pulmonary Disease (COPD), Sleep Apnea/Nocturnal Hypoxia  The patient's CT scan revealed changes consistent with emphysema. He is currently using Trelegy inhaler daily.   A referral to pulmonology will be initiated for further evaluation and management of his respiratory condition and oxygenation needs directly related to his nocturnal hypoxia.    Chronic back pain/arthritis  Patient suffers from back pain as well as arthritis.  Continue with over-the-counter treatment at this time such as ibuprofen and/or Tylenol.  Consider physical therapy and/or pain managed referral if this worsens and no longer manageable.    Plantar Fasciitis.  The patient reports heel pain and was advised by PCP to continue wearing supportive shoes.  Discussed supportive orthotics as well as Voltaren gel.  Patient may also use frozen water bottle and/or golf ball to help roll along the bottom of his feet.  Over-the-counter medications also discussed ibuprofen.  Referrals been  placed for podiatry for further management and follow-up    Fatigue/disability claim issue  Patient presents today reporting that he has seen his primary care provider regarding his disability claim paperwork.  Unfortunately this was declined or needed further information and he saw another provider within the office.  Unfortunately this was further sent back in stating that they need more medical record documentation regarding these issues.  Physical assessment form completed in office today compiled supporting documents and will fax over to insurance company later today.  Will provide patient a copy of the packet in case this does not go through again.    Return if symptoms worsen or fail to improve.       Please note that this note was created using dictation with voice recognition software. I have made every reasonable attempt to correct obvious errors, but I expect that there are errors of grammar and possibly content that I did not discover before finalizing the note.    ISATU Navarro  [facility]  [facility]

## 2025-02-05 DIAGNOSIS — G47.34 NOCTURNAL HYPOXIA: ICD-10-CM

## 2025-02-05 DIAGNOSIS — R53.82 CHRONIC FATIGUE: ICD-10-CM

## 2025-02-05 DIAGNOSIS — G47.30 SLEEP APNEA, UNSPECIFIED TYPE: ICD-10-CM

## 2025-02-07 ENCOUNTER — OFFICE VISIT (OUTPATIENT)
Dept: MEDICAL GROUP | Facility: LAB | Age: 65
End: 2025-02-07
Payer: COMMERCIAL

## 2025-02-07 VITALS
HEIGHT: 70 IN | BODY MASS INDEX: 23.19 KG/M2 | SYSTOLIC BLOOD PRESSURE: 112 MMHG | DIASTOLIC BLOOD PRESSURE: 64 MMHG | OXYGEN SATURATION: 96 % | WEIGHT: 162 LBS | HEART RATE: 96 BPM | TEMPERATURE: 97.7 F

## 2025-02-07 DIAGNOSIS — Z91.89 AT RISK FOR SLEEP APNEA: ICD-10-CM

## 2025-02-07 DIAGNOSIS — R06.83 SNORING: ICD-10-CM

## 2025-02-07 DIAGNOSIS — F17.200 TOBACCO DEPENDENCE: ICD-10-CM

## 2025-02-07 DIAGNOSIS — R53.83 FATIGUE, UNSPECIFIED TYPE: ICD-10-CM

## 2025-02-07 DIAGNOSIS — J43.1 PANLOBULAR EMPHYSEMA (HCC): ICD-10-CM

## 2025-02-07 DIAGNOSIS — D75.1 POLYCYTHEMIA: ICD-10-CM

## 2025-02-07 PROCEDURE — 3078F DIAST BP <80 MM HG: CPT | Performed by: STUDENT IN AN ORGANIZED HEALTH CARE EDUCATION/TRAINING PROGRAM

## 2025-02-07 PROCEDURE — 3074F SYST BP LT 130 MM HG: CPT | Performed by: STUDENT IN AN ORGANIZED HEALTH CARE EDUCATION/TRAINING PROGRAM

## 2025-02-07 PROCEDURE — 99204 OFFICE O/P NEW MOD 45 MIN: CPT | Performed by: STUDENT IN AN ORGANIZED HEALTH CARE EDUCATION/TRAINING PROGRAM

## 2025-02-07 ASSESSMENT — ENCOUNTER SYMPTOMS
COUGH: 0
WEIGHT LOSS: 0
FEVER: 0
BACK PAIN: 1
SHORTNESS OF BREATH: 1
ABDOMINAL PAIN: 0
VOMITING: 0
CHILLS: 0
NAUSEA: 0
DIARRHEA: 0

## 2025-02-07 ASSESSMENT — FIBROSIS 4 INDEX: FIB4 SCORE: 1.6

## 2025-02-07 NOTE — PROGRESS NOTES
Verbal consent was acquired by the patient to use [employer] ambient listening note generation during this visit Yes.    Subjective:     Chief Complaint   Patient presents with    Paperwork     Return to work letter      HPI:   PCP unavailable.    History of Present Illness  The patient is a 64-year-old male who presents for a letter to return to work.    He is employed as a  and has been on leave due to fatigue. His employer has requested a [id number] slip, which he is unable to provide at this time. He has been reapproved for his disability claim and is seeking an extension of his leave. He is scheduled to retire in 9.5 months. He does not feel safe to drive. He drives 530 miles round trip every day. He makes stops to walk around and smoke cigarettes. He is cleared for disability up until 03/01/2025.    He reports experiencing chronic fatigue and sleep disturbances, including difficulty falling asleep and maintaining sleep. He does not use oxygen at night. Despite being prescribed home oxygen by his primary care physician on 11/19/2024, he has not yet received it. He underwent a sleep study 2 to 3 years ago but has not had an overnight oxygen study. He reports no episodes of gasping for air during sleep and does snore, although his wife occasionally reports it. He has not been diagnosed with sleep apnea. He wakes up at night and has trouble going back to sleep. He can be really tired sitting in his chair watching TV, he will go to bed and end up laying there 2 or 3 hours before he falls asleep. He feels like his breathing is pretty good overall. He does not experience chest pain with exertion. He can go out and work in his yard, but if he does too much work, his back starts hurting and makes it a little bit harder for him to breathe like it is putting a restriction on his lung. He attributes that to cold weather outside.    He has a history of emphysema and continues to smoke without interest in  "cessation.     Review of Systems   Constitutional:  Positive for malaise/fatigue. Negative for chills, fever and weight loss.   Respiratory:  Positive for shortness of breath. Negative for cough.    Cardiovascular:  Negative for chest pain.   Gastrointestinal:  Negative for abdominal pain, diarrhea, nausea and vomiting.   Musculoskeletal:  Positive for back pain.   Skin:  Negative for rash.       Objective:     Exam:  /64 (BP Location: Left arm, Patient Position: Sitting, BP Cuff Size: Adult)   Pulse 96   Temp 36.5 °C (97.7 °F) (Temporal)   Ht 1.778 m (5' 10\")   Wt 73.5 kg (162 lb) Comment: pt stated  SpO2 96%   BMI 23.24 kg/m²  Body mass index is 23.24 kg/m².    Physical Exam  Vitals reviewed.   Constitutional:       General: He is not in acute distress.     Appearance: Normal appearance. He is not ill-appearing.   HENT:      Head: Normocephalic and atraumatic.      Nose: Nose normal.      Mouth/Throat:      Mouth: Mucous membranes are moist.   Cardiovascular:      Rate and Rhythm: Normal rate and regular rhythm.      Heart sounds: Normal heart sounds. No murmur heard.  Pulmonary:      Effort: Pulmonary effort is normal. No respiratory distress.      Breath sounds: Normal breath sounds. No wheezing, rhonchi or rales.   Musculoskeletal:      Cervical back: Normal range of motion and neck supple.   Skin:     General: Skin is warm and dry.   Neurological:      General: No focal deficit present.      Mental Status: He is alert and oriented to person, place, and time.   Psychiatric:         Mood and Affect: Mood normal.         Behavior: Behavior normal.         Thought Content: Thought content normal.       Labs: Reviewed from 2024  Imaging: Reviewed CT lung cancer screening 2025 (in media)    STOPBANG - Sleep Apnea Screening      Flowsheet Row Most Recent Value   S - Have you been told that you SNORE? Yes   T - Are you often TIRED during the day? Yes   O - Do you know if you stop breathing or has anyone " witnessed you stop breathing while you were asleep? (OBSTRUCTION) No   P - Do you have high blood PRESSURE or on medication to control high blood pressure? No   B - Is your Body Mass Index greater than 35? (BMI) No   A - Are you 50 years old or older? (AGE) Yes   N - Are you a male with a NECK circumference greater than 17 inches, or a female with a neck circumference greater than 16 inches? No   G - Are you male? (GENDER) Yes   STOPBANG Total Score 4   BEA Risk Intermediate Risk          Assessment & Plan:     64 y.o. male with the following -     1. Fatigue, unspecified type  This is a chronic condition and given that this patient is a  he is unsafe to continue his work.  He does have active referral to pulmonary medicine and sleep medicine but since that will take some time we will plan on obtaining studies as below. Work note provided to extend leave for the next 3 months, may need to be extended pending how he is feeling/results.     2. Panlobular emphysema (HCC)  3. Tobacco dependence  Chronic, sounds to be well-controlled with Trelegy.  Has never had documented hypoxia with overnight oximetry and has not had a sleep study in some time so we will update soon as possible.  Patient without interest in tobacco cessation.  Continue medication as below.  - Overnight Oximetry; Future    fluticasone-umeclidinium-vilanterol (TRELEGY ELLIPTA) 100-62.5-25 mcg/act inhaler, INHALE 1 PUFF BY MOUTH EVERY DAY, Disp: 180 Each, Rfl: 0    4. Polycythemia  Chronic, suspect multifactorial with concern for nocturnal hypoxia in this patient with chronic tobacco use/emphysema.  - Overnight Oximetry; Future    5. Snoring  6. At risk for sleep apnea  May be contributing if they deem to chronic fatigue, has active referral to sleep medicine but would benefit to expedite with overnight home sleep study.  Plan pending results.  - Overnight Home Sleep Study; Future    Return in about 3 months (around 5/7/2025), or if symptoms  worsen or fail to improve.    Please note that this dictation was created using voice recognition software. I have made every reasonable attempt to correct obvious errors, but I expect that there are errors of grammar and possibly content that I did not discover before finalizing the note.

## 2025-02-07 NOTE — LETTER
February 7, 2025         Patient: Dionte Rodriges   YOB: 1960   Date of Visit: 2/7/2025           To Whom it May Concern:    Dionte Rodriges was seen in my clinic on 2/7/2025. He requires extended leave from work for further evaluation/treatment of disabling condition(s). May return to work on 5/7/2025 tentatively.    If you have any questions or concerns, please don't hesitate to call.      Sincerely,       Marla Zaidi D.O.

## 2025-02-19 ENCOUNTER — HOME STUDY (OUTPATIENT)
Dept: SLEEP MEDICINE | Facility: MEDICAL CENTER | Age: 65
End: 2025-02-19
Attending: STUDENT IN AN ORGANIZED HEALTH CARE EDUCATION/TRAINING PROGRAM
Payer: COMMERCIAL

## 2025-02-19 DIAGNOSIS — Z91.89 AT RISK FOR SLEEP APNEA: ICD-10-CM

## 2025-02-19 DIAGNOSIS — R06.83 SNORING: ICD-10-CM

## 2025-02-19 PROCEDURE — 95800 SLP STDY UNATTENDED: CPT | Performed by: STUDENT IN AN ORGANIZED HEALTH CARE EDUCATION/TRAINING PROGRAM

## 2025-02-25 ENCOUNTER — RESULTS FOLLOW-UP (OUTPATIENT)
Dept: MEDICAL GROUP | Facility: LAB | Age: 65
End: 2025-02-25

## 2025-02-25 NOTE — PROCEDURES
DIAGNOSTIC HOME SLEEP TEST (HST) REPORT WatchPAT      PATIENT ID:  NAME:  Dionte Rodriges  MRN:               [id number]  YOB: 1960  DATE OF STUDY: 02/19/2025      Impression:     This study shows evidence of:      1. Severe obstructive sleep apnea with PAT apnea hypopnea index(pAHI) of 58 per hour.  PAT respiratory disturbance index (pRDI) was 58.5 per hour. These findings are based on 7 channels recording of PAT signal with sleep staging, heart rate, pulse oximetry, actigraphy, body position, snoring and respiratory movement.     2. Oxygenation O2 Sat. mean O2 sat was 88%,  josy was 83%,  and maximum O2 at 95 %. O2 sat was at or  below 88% for 246 min of evaluation time. Oxygen Desaturation (>=4%) Index was 23.2/hr. AVG HR was 74 BPM.      TECHNICAL DESCRIPTION: Patient underwent home sleep apnea testing with peripheral arterial tone signal (WatchPAT™). This is a Type IV portable monitor and device per Medicare. Monitoring was done with 7 channels recording of PAT signal with sleep staging, heart rate, pulse oximetry, actigraphy, body position, snoring and respiratory movement. Prior to using the device, the patient received verbal and written instructions for its application and was provided with the help desk phone number for additional telephonic instruction with 24-hour availability of qualified personnel to answer questions.    Respiratory events:        General sleep summary: . Total recording time is 8 hours and 4 minutes and total Sleep time is 7 hours and 4 minutes. The patient spent 182 minutes in the supine position and 242 minutes in the nonsupine position.      Recommendations:    1. Given severity of obstructive sleep apnea and nocturnal hypoxia, patient would benefit from undergoing in lab polysomnography CPAP/BiPAP titration study.  There is the potential patient may require more advanced modes of PAP therapy or supplemental oxygen with PAP therapy which is best assessed  in the sleep lab. If starting auto CPAP therapy would recommend minimum pressure 5 cmH2O maximum pressure 15 cmH2O.     2. In general patients with sleep apnea are advised to avoid alcohol and sedatives and to not operate a motor vehicle while drowsy. In some cases alternative treatment options may prove effective in resolving sleep apnea in these options include upper airway surgery, the use of a dental orthotic or weight loss and positional therapy. Clinical correlation is required.

## 2025-02-26 DIAGNOSIS — G47.34 NOCTURNAL HYPOXIA: ICD-10-CM

## 2025-02-26 DIAGNOSIS — G47.33 SEVERE OBSTRUCTIVE SLEEP APNEA: ICD-10-CM

## 2025-02-26 NOTE — RESULT ENCOUNTER NOTE
I called and spoke with patient. I informed him of the message below. I also got him scheduled to see Hoda next week for his paperwork.

## 2025-02-27 NOTE — Clinical Note
REFERRAL APPROVAL NOTICE         Sent on February 27, 2025                   Glenn Rodriges  [address] [address]                   Dear Mr. Rodriges,    After a careful review of the medical information and benefit coverage, [facility] has processed your referral. See below for additional details.    If applicable, you must be actively enrolled with your insurance for coverage of the authorized service. If you have any questions regarding your coverage, please contact your insurance directly.    REFERRAL INFORMATION   Referral #:  [id number]  Referred-To Department    Referred-By Provider:  Pulmonary and Sleep Medicine    Marla Zaidi D.O.   [facility]      [address]  [place] [address]  [phone] [address]  [place] [id number] [phone]    Referral Start Date:  02/27/2025  Referral End Date:   05/27/2025             SCHEDULING  If you do not already have an appointment, please call [phone] to make an appointment.     MORE INFORMATION  If you do not already have a [employer] account, sign up at: [email or web address]  You can access your medical information, make appointments, see lab results, billing information, and more.  If you have questions regarding this referral, please contact  the [facility] Referrals department at:             [phone]. Monday - Friday 8:00AM - 5:00PM.     Sincerely,    [facility]

## 2025-03-03 ENCOUNTER — OFFICE VISIT (OUTPATIENT)
Dept: MEDICAL GROUP | Facility: PHYSICIAN GROUP | Age: 65
End: 2025-03-03
Payer: COMMERCIAL

## 2025-03-03 VITALS
SYSTOLIC BLOOD PRESSURE: 108 MMHG | OXYGEN SATURATION: 97 % | RESPIRATION RATE: 16 BRPM | WEIGHT: 173 LBS | HEART RATE: 68 BPM | TEMPERATURE: 98.3 F | DIASTOLIC BLOOD PRESSURE: 68 MMHG | BODY MASS INDEX: 29.53 KG/M2 | HEIGHT: 64 IN

## 2025-03-03 DIAGNOSIS — E78.5 DYSLIPIDEMIA: ICD-10-CM

## 2025-03-03 DIAGNOSIS — G47.34 NOCTURNAL HYPOXIA: ICD-10-CM

## 2025-03-03 DIAGNOSIS — G47.10 HYPERSOMNIA: ICD-10-CM

## 2025-03-03 DIAGNOSIS — R53.82 CHRONIC FATIGUE: ICD-10-CM

## 2025-03-03 DIAGNOSIS — J43.1 PANLOBULAR EMPHYSEMA (HCC): ICD-10-CM

## 2025-03-03 DIAGNOSIS — G89.29 CHRONIC BILATERAL LOW BACK PAIN WITH LEFT-SIDED SCIATICA: ICD-10-CM

## 2025-03-03 DIAGNOSIS — F17.200 TOBACCO DEPENDENCE: ICD-10-CM

## 2025-03-03 DIAGNOSIS — M54.42 CHRONIC BILATERAL LOW BACK PAIN WITH LEFT-SIDED SCIATICA: ICD-10-CM

## 2025-03-03 DIAGNOSIS — D75.1 POLYCYTHEMIA: ICD-10-CM

## 2025-03-03 PROCEDURE — 99215 OFFICE O/P EST HI 40 MIN: CPT | Performed by: NURSE PRACTITIONER

## 2025-03-03 PROCEDURE — G2212 PROLONG OUTPT/OFFICE VIS: HCPCS | Performed by: NURSE PRACTITIONER

## 2025-03-03 PROCEDURE — 3074F SYST BP LT 130 MM HG: CPT | Performed by: NURSE PRACTITIONER

## 2025-03-03 PROCEDURE — 7101 PR PHYSICAL: Performed by: NURSE PRACTITIONER

## 2025-03-03 PROCEDURE — 3078F DIAST BP <80 MM HG: CPT | Performed by: NURSE PRACTITIONER

## 2025-03-03 ASSESSMENT — FIBROSIS 4 INDEX: FIB4 SCORE: 1.6
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
## 2025-03-08 PROBLEM — E78.49 OTHER HYPERLIPIDEMIA: Status: RESOLVED | Noted: 2020-06-28 | Resolved: 2025-03-08

## 2025-03-08 RX ORDER — FLUTICASONE FUROATE, UMECLIDINIUM BROMIDE AND VILANTEROL TRIFENATATE 100; 62.5; 25 UG/1; UG/1; UG/1
1 POWDER RESPIRATORY (INHALATION)
Qty: 180 EACH | Refills: 3 | Status: SHIPPED | OUTPATIENT
Start: 2025-03-08

## 2025-03-08 RX ORDER — METHOCARBAMOL 750 MG/1
750 TABLET, FILM COATED ORAL NIGHTLY
Qty: 90 TABLET | Refills: 3 | Status: SHIPPED | OUTPATIENT
Start: 2025-03-08

## 2025-03-08 RX ORDER — ATORVASTATIN CALCIUM 10 MG/1
10 TABLET, FILM COATED ORAL NIGHTLY
Qty: 90 TABLET | Refills: 3 | Status: SHIPPED | OUTPATIENT
Start: 2025-03-08

## 2025-04-02 ENCOUNTER — OFFICE VISIT (OUTPATIENT)
Dept: MEDICAL GROUP | Facility: PHYSICIAN GROUP | Age: 65
End: 2025-04-02
Payer: COMMERCIAL

## 2025-04-02 VITALS
OXYGEN SATURATION: 93 % | RESPIRATION RATE: 16 BRPM | WEIGHT: 169 LBS | DIASTOLIC BLOOD PRESSURE: 68 MMHG | HEART RATE: 88 BPM | TEMPERATURE: 98.2 F | HEIGHT: 64 IN | SYSTOLIC BLOOD PRESSURE: 100 MMHG | BODY MASS INDEX: 28.85 KG/M2

## 2025-04-02 DIAGNOSIS — G47.34 NOCTURNAL HYPOXIA: ICD-10-CM

## 2025-04-02 DIAGNOSIS — J43.1 PANLOBULAR EMPHYSEMA (HCC): ICD-10-CM

## 2025-04-02 DIAGNOSIS — G47.09 OTHER INSOMNIA: ICD-10-CM

## 2025-04-02 PROCEDURE — 3078F DIAST BP <80 MM HG: CPT | Performed by: NURSE PRACTITIONER

## 2025-04-02 PROCEDURE — 3074F SYST BP LT 130 MM HG: CPT | Performed by: NURSE PRACTITIONER

## 2025-04-02 PROCEDURE — 99214 OFFICE O/P EST MOD 30 MIN: CPT | Performed by: NURSE PRACTITIONER

## 2025-04-02 ASSESSMENT — FIBROSIS 4 INDEX: FIB4 SCORE: 1.6

## 2025-04-02 NOTE — LETTER
April 2, 2025         Patient: Dionte Rodriges   YOB: 1960   Date of Visit: 4/2/2025           To Whom it May Concern:    Dionte Rodriges was seen in my clinic on 4/2/2025. He has had improvement in his medical condition. He is able to return to work without restrictions on April 18th, 2025.     If you have any questions or concerns, please don't hesitate to call.        Sincerely,           SUSY Frye.P.RWilliamsN.  Electronically Signed

## 2025-04-02 NOTE — PROGRESS NOTES
"  Chief Complaint   Patient presents with    Letter for School/Work     To return to work                                                                                                                                       HPI:   Dionte presents today with the following.    1. Nocturnal hypoxia        2. Other insomnia        3. Panlobular emphysema (HCC)             ROS:  All systems negative expect as addressed in assessment and plan.     /68 (BP Location: Left arm, Patient Position: Sitting)   Pulse 88   Temp 36.8 °C (98.2 °F) (Temporal)   Resp 16   Ht 1.626 m (5' 4\")   Wt 76.7 kg (169 lb)   SpO2 93%   BMI 29.01 kg/m²     Objective:    Physical Exam  Vitals reviewed.   Constitutional:       Appearance: Normal appearance.   HENT:      Head: Normocephalic and atraumatic.      Mouth/Throat:      Mouth: Mucous membranes are moist.   Eyes:      Extraocular Movements: Extraocular movements intact.      Conjunctiva/sclera: Conjunctivae normal.   Pulmonary:      Effort: Pulmonary effort is normal.   Musculoskeletal:         General: Normal range of motion.      Cervical back: Normal range of motion.   Skin:     General: Skin is warm and dry.   Neurological:      General: No focal deficit present.      Mental Status: He is alert and oriented to person, place, and time.   Psychiatric:         Mood and Affect: Mood normal.         Behavior: Behavior normal.         Thought Content: Thought content normal.           Assessment and Plan:  64 y.o. male with the following issues.    1. Nocturnal hypoxia    2. Other insomnia    3. Panlobular emphysema (HCC)    Other insomnia  Chronic improving.     He states that he has been cutting down on his caffeine intake during the day. He has been sleeping 6-7 hours of sleep a night since cutting down on his caffeine intake. He states he is no longer dozing in the afternoons and reports that his energy level is improved.     Return to work note provided.         Nocturnal " hypoxia  He is scheduled for a in lab sleep study to determine if he needs oxygen supplement at night.     Continue to follow with pulmonology.        Return in about 3 months (around 7/2/2025).      Please note that this dictation was created using voice recognition software. I have worked with consultants from the vendor as well as technical experts from [facility] to optimize the interface. I have made every reasonable attempt to correct obvious errors, but I expect that there are errors of grammar and possibly content that I did not discover before finalizing the note.

## 2025-04-02 NOTE — ASSESSMENT & PLAN NOTE
Chronic improving.     He states that he has been cutting down on his caffeine intake during the day. He has been sleeping 6-7 hours of sleep a night since cutting down on his caffeine intake. He states he is no longer dozing in the afternoons and reports that his energy level is improved.     Return to work note provided.

## 2025-04-02 NOTE — ASSESSMENT & PLAN NOTE
He is scheduled for a in lab sleep study to determine if he needs oxygen supplement at night.     Continue to follow with pulmonology.

## 2025-04-04 ENCOUNTER — SLEEP STUDY (OUTPATIENT)
Dept: SLEEP MEDICINE | Facility: MEDICAL CENTER | Age: 65
End: 2025-04-04
Attending: STUDENT IN AN ORGANIZED HEALTH CARE EDUCATION/TRAINING PROGRAM
Payer: COMMERCIAL

## 2025-04-04 DIAGNOSIS — G47.34 NOCTURNAL HYPOXIA: ICD-10-CM

## 2025-04-04 DIAGNOSIS — G47.33 SEVERE OBSTRUCTIVE SLEEP APNEA: ICD-10-CM

## 2025-04-04 PROCEDURE — 95811 POLYSOM 6/>YRS CPAP 4/> PARM: CPT | Performed by: STUDENT IN AN ORGANIZED HEALTH CARE EDUCATION/TRAINING PROGRAM

## 2025-04-08 ENCOUNTER — OFFICE VISIT (OUTPATIENT)
Dept: URGENT CARE | Facility: PHYSICIAN GROUP | Age: 65
End: 2025-04-08
Payer: COMMERCIAL

## 2025-04-08 VITALS
HEART RATE: 84 BPM | TEMPERATURE: 99.1 F | RESPIRATION RATE: 16 BRPM | HEIGHT: 70 IN | SYSTOLIC BLOOD PRESSURE: 102 MMHG | WEIGHT: 170 LBS | OXYGEN SATURATION: 96 % | DIASTOLIC BLOOD PRESSURE: 70 MMHG | BODY MASS INDEX: 24.34 KG/M2

## 2025-04-08 DIAGNOSIS — H65.191 OTHER ACUTE NONSUPPURATIVE OTITIS MEDIA OF RIGHT EAR, RECURRENCE NOT SPECIFIED: ICD-10-CM

## 2025-04-08 PROCEDURE — 3078F DIAST BP <80 MM HG: CPT | Performed by: NURSE PRACTITIONER

## 2025-04-08 PROCEDURE — 99213 OFFICE O/P EST LOW 20 MIN: CPT | Performed by: NURSE PRACTITIONER

## 2025-04-08 PROCEDURE — 3074F SYST BP LT 130 MM HG: CPT | Performed by: NURSE PRACTITIONER

## 2025-04-08 ASSESSMENT — ENCOUNTER SYMPTOMS
SINUS PAIN: 0
DIZZINESS: 0
CHILLS: 0
HEADACHES: 0
FEVER: 0
SORE THROAT: 0

## 2025-04-08 ASSESSMENT — FIBROSIS 4 INDEX: FIB4 SCORE: 1.6

## 2025-04-08 NOTE — PROGRESS NOTES
Subjective     Glenn Rodriges is a 64 y.o. male who presents with Otalgia (right x 8 days )            Otalgia   Pertinent negatives include no ear discharge, headaches, hearing loss or sore throat.   Glenn is coming to urgent care today as he has been experiencing acute right ear pain x 8 days.  History of ear infections.  Denies any current nasal congestion, runny nose or facial pressure.  Has tried over-the-counter ear pain drops which did not help.    PMH:  has a past medical history of Bacterial external ear infection (4/28/2022), GERD (gastroesophageal reflux disease), H/O hypoglycemia (4/24/2017), Hemorrhoids (3/10/2014), Other hyperlipidemia (6/28/2020), Panlobular emphysema (HCC) (7/13/2017), Poor dentition (4/24/2017), and Substance abuse ([facility]).    He has no past medical history of Arrhythmia, Asthma, Blood transfusion without reported diagnosis, Cancer ([facility]), CHF (congestive heart failure) ([facility]), Clotting disorder ([facility]), Diabetic neuropathy ([facility]), Heart attack (HCC), Heart murmur, Hypertension, IBD (inflammatory bowel disease), Kidney disease, Migraine, Seizure ([facility]), Stroke ([facility]), Ulcer, or Urinary tract infection, site not specified.  MEDS:   Current Outpatient Medications:     amoxicillin-clavulanate (AUGMENTIN) 875-125 MG Tab, Take 1 Tablet by mouth 2 times a day for 7 days., Disp: 14 Tablet, Rfl: 0    fluticasone-umeclidinium-vilanterol (TRELEGY ELLIPTA) 100-62.5-25 mcg/act inhaler, Inhale 1 Puff every day., Disp: 180 Each, Rfl: 3    atorvastatin (LIPITOR) 10 MG Tab, Take 1 Tablet by mouth every evening., Disp: 90 Tablet, Rfl: 3    methocarbamol (ROBAXIN) 750 MG Tab, Take 1 Tablet by mouth every evening., Disp: 90 Tablet, Rfl: 3    aspirin (ASA) 81 MG Chew Tab chewable tablet, Chew 81 mg every day., Disp: , Rfl:     Multiple Vitamins-Minerals (CENTRUM SILVER PO), Take  by mouth., Disp: , Rfl:   ALLERGIES:   Allergies   Allergen Reactions    Donnatal Unspecified     17 years old      SURGHX:   Past  "Surgical History:   Procedure Laterality Date    HERNIA REPAIR  2000    right inguinal     OPEN REDUCTION      OTHER ABDOMINAL SURGERY      hernia sx     SOCHX:  reports that he has been smoking cigarettes. He has a 45 pack-year smoking history. He has never used smokeless tobacco. He reports that he does not drink alcohol and does not use drugs.  FH: Family history was reviewed, no pertinent findings to report      Review of Systems   Constitutional:  Negative for chills, fever and malaise/fatigue.   HENT:  Positive for ear pain. Negative for congestion, ear discharge, hearing loss, sinus pain, sore throat and tinnitus.    Neurological:  Negative for dizziness and headaches.   All other systems reviewed and are negative.             Objective     /70 (BP Location: Left arm, Patient Position: Sitting, BP Cuff Size: Adult)   Pulse 84   Temp 37.3 °C (99.1 °F) (Temporal)   Resp 16   Ht 1.778 m (5' 10\")   Wt 77.1 kg (170 lb)   SpO2 96%   BMI 24.39 kg/m²      Physical Exam  Vitals reviewed.   Constitutional:       General: He is awake. He is not in acute distress.     Appearance: He is not ill-appearing.   HENT:      Right Ear: Tenderness present. No drainage or swelling.      Left Ear: Ear canal and external ear normal.      Ears:      Comments: Right ear canal erythema with  tenderness on otoscope exam.  Neurological:      Mental Status: He is alert.   Psychiatric:         Behavior: Behavior is cooperative.                                Assessment & Plan  Other acute nonsuppurative otitis media of right ear, recurrence not specified    Orders:    amoxicillin-clavulanate (AUGMENTIN) 875-125 MG Tab; Take 1 Tablet by mouth 2 times a day for 7 days.  -May use over the counter Ibuprofen/Tylenol for any fever or ear/sinus pain  -May use over the counter longer acting allergy medication for any sinus congestion/post nasal drip related to ear pressure (chose one: Claritin/Zyrtec/Allegra without decongestant) x 1 " week then as needed   -May use saline nasal spray for any nasal congestion/post nasal drip as needed up to 4x/day   -May use over the counter nasal decongestant like Flonase/Nasacort as needed for any sinus congestion related to ear pressures x 1 week then as needed   -Maintain hydration status   -Monitor for fevers, difficulty or abnormal hearing, pain in ears, headache, dizziness- need re-evaluation in urgent care

## 2025-04-09 ENCOUNTER — OFFICE VISIT (OUTPATIENT)
Dept: MEDICAL GROUP | Facility: PHYSICIAN GROUP | Age: 65
End: 2025-04-09
Payer: COMMERCIAL

## 2025-04-09 VITALS
HEART RATE: 94 BPM | RESPIRATION RATE: 14 BRPM | HEIGHT: 70 IN | TEMPERATURE: 97.8 F | WEIGHT: 170 LBS | BODY MASS INDEX: 24.34 KG/M2 | DIASTOLIC BLOOD PRESSURE: 68 MMHG | SYSTOLIC BLOOD PRESSURE: 110 MMHG | OXYGEN SATURATION: 94 %

## 2025-04-09 DIAGNOSIS — Z02.89 ENCOUNTER FOR COMPLETION OF FORM WITH PATIENT: ICD-10-CM

## 2025-04-09 DIAGNOSIS — R53.82 CHRONIC FATIGUE: Primary | ICD-10-CM

## 2025-04-09 DIAGNOSIS — G47.34 NOCTURNAL HYPOXIA: ICD-10-CM

## 2025-04-09 ASSESSMENT — FIBROSIS 4 INDEX: FIB4 SCORE: 1.6

## 2025-04-09 NOTE — LETTER
April 9, 2025         Patient: Dionte Rodriges   YOB: 1960   Date of Visit: 4/9/2025           To Whom it May Concern:    Dionte Rodriges was seen in my clinic on 4/9/2025. He has had improvement in his medical condition. His fatigue has resolved as his medical condition affecting his sleep is now being treated. He no longer has any driving restrictions and is able to driving at night.     He can return to work on April 18th, 2025 without any physical restrictions or driving restrictions.     If you have any questions or concerns, please don't hesitate to call.        Sincerely,           BEHZAD Frye.  Electronically Signed

## 2025-04-09 NOTE — PROGRESS NOTES
"  Chief Complaint   Patient presents with    Letter for School/Work     For work     Follow-Up     Chronic fatigue                                                                                                                                       HPI:   Dionte presents today with the following.    1. Chronic fatigue        2. Encounter for completion of form with patient        3. Nocturnal hypoxia              ROS:  All systems negative expect as addressed in assessment and plan.     /68 (BP Location: Left arm, Patient Position: Sitting, BP Cuff Size: Adult)   Pulse 94   Temp 36.6 °C (97.8 °F) (Temporal)   Resp 14   Ht 1.778 m (5' 10\")   Wt 77.1 kg (170 lb)   SpO2 94%   BMI 24.39 kg/m²     Objective:    Physical Exam  Vitals reviewed.   Constitutional:       Appearance: Normal appearance.   HENT:      Head: Normocephalic and atraumatic.      Mouth/Throat:      Mouth: Mucous membranes are moist.   Eyes:      Extraocular Movements: Extraocular movements intact.      Conjunctiva/sclera: Conjunctivae normal.   Pulmonary:      Effort: Pulmonary effort is normal.   Musculoskeletal:         General: Normal range of motion.      Cervical back: Normal range of motion.   Skin:     General: Skin is warm and dry.   Neurological:      General: No focal deficit present.      Mental Status: He is alert and oriented to person, place, and time.   Psychiatric:         Mood and Affect: Mood normal.         Behavior: Behavior normal.         Thought Content: Thought content normal.           Assessment and Plan:  64 y.o. male with the following issues.    1. Chronic fatigue    2. Encounter for completion of form with patient    3. Nocturnal hypoxia    Chronic fatigue  Chronic improved.     He has been sleeping better and recently completed a sleep study to determine proper PAP therapy.     He has been averaging 6-8 hours a night. He states he wakes up feeling rested.     He no longer has any medical need for " restrictions on driving.     Patient provided with a letter to return to work without restrictions. He can drive at night.        Return in about 6 months (around 10/9/2025) for Chronic Health Conditions.      Please note that this dictation was created using voice recognition software. I have worked with consultants from the vendor as well as technical experts from [facility] to optimize the interface. I have made every reasonable attempt to correct obvious errors, but I expect that there are errors of grammar and possibly content that I did not discover before finalizing the note.

## 2025-04-09 NOTE — PROCEDURES
Patient: VON PRIDE  ID: [id number] Date: 4/4/2025   MONTAGE: Standard  STUDY TYPE: Treatment  RECORDING TECHNIQUE:   After the scalp was prepared, gold plated electrodes were applied to the scalp according to the International 10-20 System. EEG (electroencephalogram) was continuously monitored from the O1-M2, O2-M1, C3-M2, C4-M1, F3-M2, and F4-M1. EOGs (electrooculograms) were monitored by electrodes placed at the left and right outer canthi. Chin EMG (electromyogram) was monitored by electrodes placed on the mentalis and sub-mentalis muscles. Nasal and oral airflow were monitored using a triple port thermocouple as well as oronasal pressure transducer. Respiratory effort was measured by inductive plethysmography technology employing abdominal and thoracic belts. Blood oxygen saturation and pulse were monitored by pulse oximetry. Heart rhythm was monitored by surface electrocardiogram. Leg EMG was studied using surface electrodes placed on left and right anterior tibialis. A microphone was used to monitor tracheal sounds and snoring. Body position was monitored and documented by technician observation.   SCORING CRITERIA:   A modification of the AASM manual for scoring of sleep and associated events was used. Obstructive apneas were scored by cessation of airflow for at least 10 seconds with continuing respiratory effort. Central apneas were scored by cessation of airflow for at least 10 seconds with no respiratory effort. Hypopneas were scored by a 30% or more reduction in airflow for at least 10 seconds accompanied by arterial oxygen desaturation of 3% or an arousal. For CMS (Medicare) patients, per AASM rule 1B, hypopneas are scored by 30% with mild reduction in airflow for at least 10 seconds accompanied by arterial saturation decreased at 4%.    Study start time was 10:07:13 PM. Diagnostic recording time was 7h 46.5m with a total sleep time of 6h 29.5m resulting in a sleep efficiency of 83.49%%. Sleep  latency from the start of the study was 11 minutes and the latency from sleep to REM was 104 minutes. In total,150 arousals were scored for an arousal index of 23.1.  Respiratory:  There were a total of 50 apneas consisting of 3 obstructive apneas, 0 mixed apneas, and 47 central apneas. A total of 47 hypopneas were scored. The apnea index was 7.70 per hour and the hypopnea index was 7.24 per hour resulting in an overall AHI of 14.94. AHI during REM was 1.0 and AHI while supine was 28.74.  Oximetry:  There was a mean oxygen saturation of 91.0%. The minimum oxygen saturation in NREM was 83.0 % and in REM was 87.0%. The patient spent 61.9 minutes of TST with SaO2 <88%.  Cardiac:  The highest heart rate seen while awake was 97 BPM while the highest heart rate during sleep was 97 BPM with an average sleeping heart rate of 76 BPM.  Limb Movements:  There were a total of 185 PLMs during sleep which resulted in a PLMS index of 28.5. Of these, 24 were associated with arousals which resulted in a PLMS arousal index of 3.7.  Titration: CPAP was tried from 5-13cm H2O.  This was a fully attended sleep study. This test was technically adequate. This patient was titrated on CPAP starting at 5 cm of water pressure. Patient was titrated up to 13 cm of water pressure. Patient did best at 12 cm of water pressure. Patient spent 114 minutes at that pressure and the AHI was 10 which is considered mild obstructive sleep apnea.     Impression:  1.  Patient used CPAP during study  2.  Post arousal centrals noted  3.  PLM's noted  4.  Overall patient did have improvement in the level of sleep apnea  5.  Nocturnal hypoxia was present particularly at lower levels of PAP pressure  6.  Supine REM sleep was seen    Recommendations:  I recommend auto CPAP 7-14 cmH2O.  Patient used Large  F20 mask during night of study.    I also recommend 30 day compliance download to assess the efficacy to the recommended pressure, measure leak, apnea hypopnea  index and compliance for further outpatient monitoring and management of PAP therapy. In some cases alternative treatment options may be proven effective in resolving sleep apnea. These options include upper airway surgery, the use of a dental orthotic, weight loss, or positional therapy. Clinical correlation is required. In general patients with sleep apnea are advised to avoid alcohol, sedatives and not to operate a motor vehicle while drowsy.  Untreated sleep apnea increases the risk for cardiovascular and neurovascular disease.

## 2025-04-09 NOTE — LETTER
April 9, 2025         Patient: Dionte Rodriges   YOB: 1960   Date of Visit: 4/9/2025           To Whom it May Concern:    Dionte Rodriges was seen in my clinic on 4/9/2025. He has had improvement in his medical condition. His fatigue has resolved as his medical condition affecting his sleep is now being treated. He no longer has any driving restrictions and is able to driving at night.     If you have any questions or concerns, please don't hesitate to call.        Sincerely,           SUSY [email or web address].[email or web address].  Electronically Signed

## 2025-04-14 ENCOUNTER — TELEPHONE (OUTPATIENT)
Dept: HEALTH INFORMATION MANAGEMENT | Facility: OTHER | Age: 65
End: 2025-04-14
Payer: COMMERCIAL

## 2025-04-17 ENCOUNTER — APPOINTMENT (OUTPATIENT)
Dept: SLEEP MEDICINE | Facility: MEDICAL CENTER | Age: 65
End: 2025-04-17
Attending: STUDENT IN AN ORGANIZED HEALTH CARE EDUCATION/TRAINING PROGRAM
Payer: COMMERCIAL

## 2025-04-20 ENCOUNTER — RESULTS FOLLOW-UP (OUTPATIENT)
Dept: MEDICAL GROUP | Facility: LAB | Age: 65
End: 2025-04-20

## 2025-06-23 ENCOUNTER — OFFICE VISIT (OUTPATIENT)
Dept: MEDICAL GROUP | Facility: PHYSICIAN GROUP | Age: 65
End: 2025-06-23
Payer: COMMERCIAL

## 2025-06-23 VITALS
WEIGHT: 169.5 LBS | DIASTOLIC BLOOD PRESSURE: 74 MMHG | HEART RATE: 73 BPM | BODY MASS INDEX: 24.26 KG/M2 | RESPIRATION RATE: 12 BRPM | TEMPERATURE: 97.2 F | OXYGEN SATURATION: 96 % | HEIGHT: 70 IN | SYSTOLIC BLOOD PRESSURE: 118 MMHG

## 2025-06-23 DIAGNOSIS — J43.1 PANLOBULAR EMPHYSEMA (HCC): ICD-10-CM

## 2025-06-23 DIAGNOSIS — G89.29 CHRONIC BILATERAL LOW BACK PAIN WITH LEFT-SIDED SCIATICA: ICD-10-CM

## 2025-06-23 DIAGNOSIS — E78.5 DYSLIPIDEMIA: ICD-10-CM

## 2025-06-23 DIAGNOSIS — M54.42 CHRONIC BILATERAL LOW BACK PAIN WITH LEFT-SIDED SCIATICA: ICD-10-CM

## 2025-06-23 DIAGNOSIS — M25.511 ACUTE PAIN OF RIGHT SHOULDER: Primary | ICD-10-CM

## 2025-06-23 PROCEDURE — 3078F DIAST BP <80 MM HG: CPT | Performed by: FAMILY MEDICINE

## 2025-06-23 PROCEDURE — 99214 OFFICE O/P EST MOD 30 MIN: CPT | Performed by: FAMILY MEDICINE

## 2025-06-23 PROCEDURE — 3074F SYST BP LT 130 MM HG: CPT | Performed by: FAMILY MEDICINE

## 2025-06-23 RX ORDER — FLUTICASONE FUROATE, UMECLIDINIUM BROMIDE AND VILANTEROL TRIFENATATE 100; 62.5; 25 UG/1; UG/1; UG/1
1 POWDER RESPIRATORY (INHALATION)
Qty: 180 EACH | Refills: 3 | Status: SHIPPED | OUTPATIENT
Start: 2025-06-23

## 2025-06-23 SDOH — ECONOMIC STABILITY: FOOD INSECURITY: WITHIN THE PAST 12 MONTHS, THE FOOD YOU BOUGHT JUST DIDN'T LAST AND YOU DIDN'T HAVE MONEY TO GET MORE.: NEVER TRUE

## 2025-06-23 SDOH — ECONOMIC STABILITY: TRANSPORTATION INSECURITY
IN THE PAST 12 MONTHS, HAS LACK OF TRANSPORTATION KEPT YOU FROM MEETINGS, WORK, OR FROM GETTING THINGS NEEDED FOR DAILY LIVING?: NO

## 2025-06-23 SDOH — HEALTH STABILITY: PHYSICAL HEALTH: ON AVERAGE, HOW MANY MINUTES DO YOU ENGAGE IN EXERCISE AT THIS LEVEL?: 20 MIN

## 2025-06-23 SDOH — HEALTH STABILITY: PHYSICAL HEALTH: ON AVERAGE, HOW MANY DAYS PER WEEK DO YOU ENGAGE IN MODERATE TO STRENUOUS EXERCISE (LIKE A BRISK WALK)?: 2 DAYS

## 2025-06-23 SDOH — ECONOMIC STABILITY: INCOME INSECURITY: HOW HARD IS IT FOR YOU TO PAY FOR THE VERY BASICS LIKE FOOD, HOUSING, MEDICAL CARE, AND HEATING?: NOT HARD AT ALL

## 2025-06-23 SDOH — ECONOMIC STABILITY: HOUSING INSECURITY
IN THE LAST 12 MONTHS, WAS THERE A TIME WHEN YOU DID NOT HAVE A STEADY PLACE TO SLEEP OR SLEPT IN A SHELTER (INCLUDING NOW)?: NO

## 2025-06-23 SDOH — ECONOMIC STABILITY: FOOD INSECURITY: WITHIN THE PAST 12 MONTHS, YOU WORRIED THAT YOUR FOOD WOULD RUN OUT BEFORE YOU GOT MONEY TO BUY MORE.: NEVER TRUE

## 2025-06-23 SDOH — ECONOMIC STABILITY: INCOME INSECURITY: IN THE LAST 12 MONTHS, WAS THERE A TIME WHEN YOU WERE NOT ABLE TO PAY THE MORTGAGE OR RENT ON TIME?: NO

## 2025-06-23 SDOH — ECONOMIC STABILITY: TRANSPORTATION INSECURITY
IN THE PAST 12 MONTHS, HAS THE LACK OF TRANSPORTATION KEPT YOU FROM MEDICAL APPOINTMENTS OR FROM GETTING MEDICATIONS?: NO

## 2025-06-23 SDOH — HEALTH STABILITY: MENTAL HEALTH
STRESS IS WHEN SOMEONE FEELS TENSE, NERVOUS, ANXIOUS, OR CAN'T SLEEP AT NIGHT BECAUSE THEIR MIND IS TROUBLED. HOW STRESSED ARE YOU?: NOT AT ALL

## 2025-06-23 SDOH — ECONOMIC STABILITY: TRANSPORTATION INSECURITY
IN THE PAST 12 MONTHS, HAS LACK OF RELIABLE TRANSPORTATION KEPT YOU FROM MEDICAL APPOINTMENTS, MEETINGS, WORK OR FROM GETTING THINGS NEEDED FOR DAILY LIVING?: NO

## 2025-06-23 ASSESSMENT — SOCIAL DETERMINANTS OF HEALTH (SDOH)
DO YOU BELONG TO ANY CLUBS OR ORGANIZATIONS SUCH AS CHURCH GROUPS UNIONS, FRATERNAL OR ATHLETIC GROUPS, OR SCHOOL GROUPS?: NO
DO YOU BELONG TO ANY CLUBS OR ORGANIZATIONS SUCH AS CHURCH GROUPS UNIONS, FRATERNAL OR ATHLETIC GROUPS, OR SCHOOL GROUPS?: NO
HOW OFTEN DO YOU HAVE A DRINK CONTAINING ALCOHOL: NEVER
HOW HARD IS IT FOR YOU TO PAY FOR THE VERY BASICS LIKE FOOD, HOUSING, MEDICAL CARE, AND HEATING?: NOT HARD AT ALL
IN A TYPICAL WEEK, HOW MANY TIMES DO YOU TALK ON THE PHONE WITH FAMILY, FRIENDS, OR NEIGHBORS?: THREE TIMES A WEEK
WITHIN THE PAST 12 MONTHS, YOU WORRIED THAT YOUR FOOD WOULD RUN OUT BEFORE YOU GOT THE MONEY TO BUY MORE: NEVER TRUE
HOW OFTEN DO YOU ATTENT MEETINGS OF THE CLUB OR ORGANIZATION YOU BELONG TO?: NEVER
HOW OFTEN DO YOU GET TOGETHER WITH FRIENDS OR RELATIVES?: ONCE A WEEK
IN THE PAST 12 MONTHS, HAS THE ELECTRIC, GAS, OIL, OR WATER COMPANY THREATENED TO SHUT OFF SERVICE IN YOUR HOME?: NO
HOW OFTEN DO YOU ATTEND CHURCH OR RELIGIOUS SERVICES?: NEVER
IN A TYPICAL WEEK, HOW MANY TIMES DO YOU TALK ON THE PHONE WITH FAMILY, FRIENDS, OR NEIGHBORS?: THREE TIMES A WEEK
HOW OFTEN DO YOU ATTEND CHURCH OR RELIGIOUS SERVICES?: NEVER
HOW OFTEN DO YOU HAVE SIX OR MORE DRINKS ON ONE OCCASION: NEVER
HOW MANY DRINKS CONTAINING ALCOHOL DO YOU HAVE ON A TYPICAL DAY WHEN YOU ARE DRINKING: PATIENT DOES NOT DRINK
HOW OFTEN DO YOU GET TOGETHER WITH FRIENDS OR RELATIVES?: ONCE A WEEK
HOW OFTEN DO YOU ATTENT MEETINGS OF THE CLUB OR ORGANIZATION YOU BELONG TO?: NEVER

## 2025-06-23 ASSESSMENT — LIFESTYLE VARIABLES
AUDIT-C TOTAL SCORE: 0
HOW MANY STANDARD DRINKS CONTAINING ALCOHOL DO YOU HAVE ON A TYPICAL DAY: PATIENT DOES NOT DRINK
SKIP TO QUESTIONS 9-10: 1
HOW OFTEN DO YOU HAVE A DRINK CONTAINING ALCOHOL: NEVER
HOW OFTEN DO YOU HAVE SIX OR MORE DRINKS ON ONE OCCASION: NEVER

## 2025-06-23 ASSESSMENT — FIBROSIS 4 INDEX: FIB4 SCORE: 1.6

## 2025-06-23 NOTE — PROGRESS NOTES
Verbal consent was acquired by the patient to use [employer] ambient listening note generation during this visit YES    Subjective:     HPI:   History of Present Illness    Chief Complaint   Patient presents with    [facility]    Shoulder Pain     [id number] right side sharp, jabbing pain      This is a 64-year-old male, new patient to me, here to establish care.  Patient previously followed by Genia Patrick here at [facility].  Patient here wanting mainly to discuss some right sided shoulder pain for the past 3 weeks.  Patient also needing a refill on his Trelegy Ellipta inhaler.  Patient carries a diagnosis of COPD/emphysema.  Chart reviewed, and it appears that patient did undergo lung cancer screening in January 2025.  Patient with history of dyslipidemia, and is currently on atorvastatin 10 mg daily.  It appears his last lipid panel was done in November 2024.      Shoulder Pain  - The patient reports experiencing acute, sharp shoulder pain for the past three weeks, described as a sensation akin to a nail being driven into the bone.  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  - Additionally, he has no history of shoulder surgery.  - Due to concurrent use of low-dose aspirin for anticoagulation following a deep vein thrombosis in his left leg 3 to 4 years ago, he is unable to take ibuprofen.  - He has not been using any anti-inflammatory medications for this condition.  - He reports some alleviation of pain by keeping his arm close to his chest while driving.    Back Spasms  - The patient has a diagnosis of chronic arthritis affecting his back.  - As a , he drives approximately 530 miles daily.  - He uses methocarbamol once daily at bedtime to manage back spasms.  - He trials new medications on weekends to assess their effects.  - Methocarbamol  "induces somnolence, hence its administration at night.  - He does not require a refill for this medication.    Medication Management  - The patient ran out of ConfideleKasenna approximately nine days ago.  - He obtains this medication at no cost through an online coupon.  - He administers Trelegy before bedtime, noting its efficacy for 6 to 8 hours.  - The patient is on low-dose atorvastatin for hypercholesterolemia.  - He anticipates notification from the pharmacy within a few days regarding the refill of his cholesterol medication.    Supplemental information: None    SOCIAL HISTORY  - Does not drink alcohol  - Does not use drugs  - , drives 530 miles a day      Objective:     Exam:  /74 (BP Location: Right arm, Patient Position: Sitting, BP Cuff Size: Adult)   Pulse 73   Temp 36.2 °C (97.2 °F) (Temporal)   Resp 12   Ht 1.778 m (5' 10\")   Wt 76.9 kg (169 lb 8 oz)   SpO2 96%   BMI 24.32 kg/m²  Body mass index is 24.32 kg/m².    Physical Exam  Vitals and nursing note reviewed.   Constitutional:       Appearance: Normal appearance.   HENT:      Head: Normocephalic and atraumatic.   Cardiovascular:      Rate and Rhythm: Normal rate and regular rhythm.   Pulmonary:      Effort: Pulmonary effort is normal.      Breath sounds: Wheezing (few wheezes bilateral apices) present.   Musculoskeletal:      Right shoulder: Normal.   Neurological:      Mental Status: He is alert. Mental status is at baseline.   Psychiatric:         Mood and Affect: Mood normal.         Behavior: Behavior normal.         Results      Assessment & Plan:     1. Acute pain of right shoulder        2. Dyslipidemia        3. Panlobular emphysema (HCC)  fluticasone-umeclidinium-vilanterol (TRELEGY ELLIPTA) 100-62.5-25 mcg/PUFF inhaler      4. Chronic bilateral low back pain with left-sided sciatica            Assessment & Plan  1. Acute right shoulder pain: Most likely early osteoarthritis.  - Full range of motion and normal exam. No " findings to suggest rotator cuff tendinopathy.  - Discussed using topical NSAIDs such as Voltaren gel, which can be applied 4 times a day, as oral NSAIDs are contraindicated due to daily aspirin use.  - Ice packs can be used as needed.  - Further workup, including imaging, offered if no improvement.    2. Dyslipidemia.  - Continue low-dose atorvastatin 10 mg daily.  - Up-to-date on labs with last lipid panel done in November 2024.    3. Panlobular emphysema.  - Well controlled on current therapy with Trelegy Ellipta.  - Up-to-date on lung cancer screening.  - Refill for Trelegy Ellipta will be sent to the pharmacy.    4. Chronic bilateral low back pain with occasional muscle spasms.  - Uses methocarbamol 750 mg nightly but plans to taper off soon due to impending snf, which may improve back pain.  - No refill for methocarbamol needed at this time.    Follow-up  - Appointment scheduled for next month.          No follow-ups on file.    Please note that this dictation was created using voice recognition software. I have made every reasonable attempt to correct obvious errors, but I expect that there are errors of grammar and possibly content that I did not discover before finalizing the note.

## 2025-08-07 ENCOUNTER — OFFICE VISIT (OUTPATIENT)
Dept: MEDICAL GROUP | Facility: PHYSICIAN GROUP | Age: 65
End: 2025-08-07
Payer: COMMERCIAL

## 2025-08-07 VITALS
SYSTOLIC BLOOD PRESSURE: 126 MMHG | OXYGEN SATURATION: 98 % | WEIGHT: 169 LBS | BODY MASS INDEX: 24.2 KG/M2 | DIASTOLIC BLOOD PRESSURE: 78 MMHG | TEMPERATURE: 97.7 F | RESPIRATION RATE: 12 BRPM | HEART RATE: 68 BPM | HEIGHT: 70 IN

## 2025-08-07 DIAGNOSIS — M25.511 ACUTE PAIN OF RIGHT SHOULDER: Primary | ICD-10-CM

## 2025-08-07 PROCEDURE — 99213 OFFICE O/P EST LOW 20 MIN: CPT | Performed by: FAMILY MEDICINE

## 2025-08-07 PROCEDURE — 3074F SYST BP LT 130 MM HG: CPT | Performed by: FAMILY MEDICINE

## 2025-08-07 PROCEDURE — 3078F DIAST BP <80 MM HG: CPT | Performed by: FAMILY MEDICINE

## 2025-08-07 ASSESSMENT — FIBROSIS 4 INDEX: FIB4 SCORE: 1.6
